# Patient Record
Sex: MALE | Race: BLACK OR AFRICAN AMERICAN | Employment: UNEMPLOYED | URBAN - METROPOLITAN AREA
[De-identification: names, ages, dates, MRNs, and addresses within clinical notes are randomized per-mention and may not be internally consistent; named-entity substitution may affect disease eponyms.]

---

## 2017-12-21 ENCOUNTER — TELEPHONE (OUTPATIENT)
Dept: ORTHOPEDIC SURGERY | Age: 52
End: 2017-12-21

## 2017-12-21 PROBLEM — S82.831A DISPLACED FRACTURE OF DISTAL END OF RIGHT FIBULA: Status: ACTIVE | Noted: 2017-12-21

## 2017-12-21 PROBLEM — R29.6 FREQUENT FALLS: Status: ACTIVE | Noted: 2017-12-21

## 2018-01-05 ENCOUNTER — OFFICE VISIT (OUTPATIENT)
Dept: ORTHOPEDIC SURGERY | Age: 53
End: 2018-01-05

## 2018-01-05 VITALS
BODY MASS INDEX: 24.34 KG/M2 | SYSTOLIC BLOOD PRESSURE: 134 MMHG | WEIGHT: 170 LBS | HEART RATE: 86 BPM | DIASTOLIC BLOOD PRESSURE: 79 MMHG | HEIGHT: 70 IN

## 2018-01-05 DIAGNOSIS — S82.831A DISPLACED FRACTURE OF DISTAL END OF RIGHT FIBULA: Primary | ICD-10-CM

## 2018-01-05 PROCEDURE — L4361 PNEUMA/VAC WALK BOOT PRE OTS: HCPCS | Performed by: ORTHOPAEDIC SURGERY

## 2018-01-05 PROCEDURE — 73610 X-RAY EXAM OF ANKLE: CPT | Performed by: ORTHOPAEDIC SURGERY

## 2018-01-05 PROCEDURE — 99024 POSTOP FOLLOW-UP VISIT: CPT | Performed by: ORTHOPAEDIC SURGERY

## 2018-01-05 NOTE — PROGRESS NOTES
instructions for the use of and application of this item were provided. They were instructed to contact the office immediately should the brace result in increased pain, decreased sensation, increased swelling or worsening of the condition. We'll go ahead and have him fitted for a tall boot. He understands he is still nonweightbearing on this ankle. He can remove the boot for showers and use a shower chair. Otherwise, he can remove it for short periods as long as he is not at risk of walking on his ankle. He needs to continue nonweightbearing for another 3 weeks. I will see him back in 3 weeks to check progress with repeat x-rays 3 views right ankle. He understands and accepts this course of care.

## 2018-01-26 ENCOUNTER — OFFICE VISIT (OUTPATIENT)
Dept: ORTHOPEDIC SURGERY | Age: 53
End: 2018-01-26

## 2018-01-26 VITALS — SYSTOLIC BLOOD PRESSURE: 124 MMHG | DIASTOLIC BLOOD PRESSURE: 76 MMHG | HEART RATE: 80 BPM

## 2018-01-26 DIAGNOSIS — S82.61XD CLOSED FRACTURE OF DISTAL LATERAL MALLEOLUS OF RIGHT FIBULA WITH ROUTINE HEALING, SUBSEQUENT ENCOUNTER: Primary | ICD-10-CM

## 2018-01-26 PROCEDURE — 99024 POSTOP FOLLOW-UP VISIT: CPT | Performed by: ORTHOPAEDIC SURGERY

## 2018-01-29 ENCOUNTER — TELEPHONE (OUTPATIENT)
Dept: ORTHOPEDIC SURGERY | Age: 53
End: 2018-01-29

## 2018-03-02 ENCOUNTER — OFFICE VISIT (OUTPATIENT)
Dept: ORTHOPEDIC SURGERY | Age: 53
End: 2018-03-02

## 2018-03-02 VITALS
WEIGHT: 170 LBS | DIASTOLIC BLOOD PRESSURE: 62 MMHG | HEART RATE: 73 BPM | SYSTOLIC BLOOD PRESSURE: 131 MMHG | BODY MASS INDEX: 24.34 KG/M2 | HEIGHT: 70 IN

## 2018-03-02 DIAGNOSIS — S82.61XD CLOSED FRACTURE OF DISTAL LATERAL MALLEOLUS OF RIGHT FIBULA WITH ROUTINE HEALING, SUBSEQUENT ENCOUNTER: Primary | ICD-10-CM

## 2018-03-02 PROCEDURE — L1902 AFO ANKLE GAUNTLET PRE OTS: HCPCS | Performed by: PHYSICIAN ASSISTANT

## 2018-03-02 PROCEDURE — 99024 POSTOP FOLLOW-UP VISIT: CPT | Performed by: PHYSICIAN ASSISTANT

## 2018-03-02 NOTE — PROGRESS NOTES
Patient Name: Alison Link  Medical Record Number: [id number]  YOB: 1965  Date of Encounter: 3/2/2018     Chief Complaint   Patient presents with    Post-Op Check     fu check for RT ankle ORIF, dos 12/21/17. doing better than before but still some pain        History of Present Illness:   Mr. Alison Link is here in follow up regarding his right distal fibular fracture with ORIF on 12/21/2017. Patient has been at the nursing home. He does have cerebellar atrophy and is chronically unstable on his feet. He states he continues to have pain on the lateral aspect of his right ankle and does not believe it is healed despite what the x-rays show. The patient's past medical history, medications, allergies, family history, social history, and review of systems have been reviewed, and dated and are recorded in the chart under the 'MEDIA\" tab. Physical Exam:    Mr. Alison Link appears well, he is in no apparent distress, he demonstrates appropriate mood & affect. He is alert and oriented to person, place and time. /62   Pulse 73   Ht 5' 10\" (1.778 m)   Wt 170 lb (77.1 kg)   BMI 24.39 kg/m²     On examination of patient's right ankle there is no obvious swelling or joint effusion. Surgical wound is well-healed without signs of infection. He denies tenderness on palpation of the lateral malleolus. His right ankle still very stiff however I believe some of this could be due to his baseline cerebellar atrophy and the fact that he has been in a cam walker boot for several months now. He is moving all of his toes. He has 2 plaster Celsius and posterior tibial pulses with capillary refill <2 seconds. There is no edema or erythema in the affected extremity. There are no signs/symptoms of DVT/PE or infection    Radiology:  X-rays obtained and reviewed in office:   Views:  3 view right ankle including AP, lateral and oblique  Impression:  Patient appears to have good bone healing.   Hardware

## 2018-06-14 ENCOUNTER — TELEPHONE (OUTPATIENT)
Dept: ORTHOPEDIC SURGERY | Age: 53
End: 2018-06-14

## 2018-10-17 ENCOUNTER — OFFICE VISIT (OUTPATIENT)
Dept: ORTHOPEDIC SURGERY | Age: 53
End: 2018-10-17
Payer: COMMERCIAL

## 2018-10-17 VITALS
WEIGHT: 180 LBS | SYSTOLIC BLOOD PRESSURE: 138 MMHG | DIASTOLIC BLOOD PRESSURE: 87 MMHG | HEART RATE: 96 BPM | HEIGHT: 70 IN | BODY MASS INDEX: 25.77 KG/M2

## 2018-10-17 DIAGNOSIS — M79.651 PAIN OF RIGHT THIGH: Primary | ICD-10-CM

## 2018-10-17 PROCEDURE — 99213 OFFICE O/P EST LOW 20 MIN: CPT | Performed by: PHYSICIAN ASSISTANT

## 2018-10-17 NOTE — PROGRESS NOTES
100 mg by mouth 2 times daily 30 capsule 0    vitamin B-12 (CYANOCOBALAMIN) 1000 MCG tablet Take 1,000 mcg by mouth daily      naproxen (NAPROSYN) 500 MG tablet Take 1 tablet by mouth 2 times daily for 20 doses 20 tablet 0     No current facility-administered medications for this visit. Allergies, social and family histories were reviewed and updated as appropriate. Review of Systems:  Relevant review of systems reviewed and available in the patient's chart under the 'MEDIA' tab. Vital Signs:  /87   Pulse 96   Ht 5' 10\" (1.778 m)   Wt 180 lb (81.6 kg)   BMI 25.83 kg/m²     General Exam:   Constitutional: Patient is adequately groomed with no evidence of malnutrition  Mental Status: The patient is oriented to time, place and person. The patient's mood and affect are appropriate. Lymphatic: The lymphatic examination bilaterally reveals all areas to be without enlargement or induration. Neurological: The patient has good coordination and balance. There is no focal weakness or sensory deficit. Right Thigh Examination:    Inspection:  Mild muscle atrophy. No gross atrophy in any particular myotome. There is no obvious swelling or joint effusion of the hip. There are no abrasions, lacerations, contusions, hematomas or ecchymosis. There is no erythema, induration or warmth to suggest an infectious process. Palpation: Patient has mild tenderness on palpation of the entire right anterior thigh     Range of Motion:   Hip flexion: 115°   Hip abduction: 45°   Hip adduction: 30°   Hip internal rotation at 90° of flexion: 35°   hip external rotation at 90° of flexion: 50°       Knee range of motion shows functional range without pain. Ankle dorsiflexion and plantarflexion show functional range of motion.     Strength:    Hip flexion  4-/5   Hip abduction 4- / 5   Hip adduction 4-/5   Knee flexion and extension 4/5 without pain   DF/ PF ankle 4/5    Special Tests:    Log roll (intraarticular care.          Electronically signed by Marbella Boyce PA-C on 38/46/2670  Board Certified [facility]    Please note that portions of this note were completed with a voice recognition program.  Efforts were made to edit the dictations but occasionally words are mis-transcribed.

## 2018-10-25 ENCOUNTER — TELEPHONE (OUTPATIENT)
Dept: ORTHOPEDIC SURGERY | Age: 53
End: 2018-10-25

## 2018-10-25 RX ORDER — METHOCARBAMOL 500 MG/1
500 TABLET, FILM COATED ORAL 3 TIMES DAILY PRN
Qty: 30 TABLET | Refills: 1 | Status: SHIPPED | OUTPATIENT
Start: 2018-10-25 | End: 2018-10-25 | Stop reason: CLARIF

## 2018-10-25 NOTE — TELEPHONE ENCOUNTER
Spoke to [facility] and she stated that patient is in pain and is having a hard time doing PT. She said patient stated that a muscle relaxer ws discussed at his appointment, but I don't see that in the note. Please advise.

## 2019-01-15 ENCOUNTER — APPOINTMENT (OUTPATIENT)
Dept: GENERAL RADIOLOGY | Age: 54
End: 2019-01-15
Payer: COMMERCIAL

## 2019-01-15 ENCOUNTER — HOSPITAL ENCOUNTER (EMERGENCY)
Age: 54
Discharge: HOME | End: 2019-01-16
Attending: EMERGENCY MEDICINE
Payer: COMMERCIAL

## 2019-01-15 VITALS
OXYGEN SATURATION: 95 % | WEIGHT: 180 LBS | HEIGHT: 70 IN | DIASTOLIC BLOOD PRESSURE: 94 MMHG | SYSTOLIC BLOOD PRESSURE: 155 MMHG | TEMPERATURE: 98.8 F | BODY MASS INDEX: 25.77 KG/M2 | RESPIRATION RATE: 18 BRPM | HEART RATE: 99 BPM

## 2019-01-15 DIAGNOSIS — M79.604 RIGHT LEG PAIN: Primary | ICD-10-CM

## 2019-01-15 PROCEDURE — 6370000000 HC RX 637 (ALT 250 FOR IP): Performed by: PHYSICIAN ASSISTANT

## 2019-01-15 PROCEDURE — 93971 EXTREMITY STUDY: CPT

## 2019-01-15 PROCEDURE — 73590 X-RAY EXAM OF LOWER LEG: CPT

## 2019-01-15 PROCEDURE — 99283 EMERGENCY DEPT VISIT LOW MDM: CPT

## 2019-01-15 PROCEDURE — 73552 X-RAY EXAM OF FEMUR 2/>: CPT

## 2019-01-15 RX ORDER — TRAMADOL HYDROCHLORIDE 50 MG/1
50 TABLET ORAL EVERY 6 HOURS PRN
Qty: 20 TABLET | Refills: 0 | Status: SHIPPED | OUTPATIENT
Start: 2019-01-15 | End: 2019-01-20

## 2019-01-15 RX ORDER — CYCLOBENZAPRINE HCL 10 MG
10 TABLET ORAL 3 TIMES DAILY PRN
Qty: 30 TABLET | Refills: 0 | Status: SHIPPED | OUTPATIENT
Start: 2019-01-15 | End: 2019-01-24 | Stop reason: ALTCHOICE

## 2019-01-15 RX ORDER — HYDROCODONE BITARTRATE AND ACETAMINOPHEN 5; 325 MG/1; MG/1
1 TABLET ORAL ONCE
Status: COMPLETED | OUTPATIENT
Start: 2019-01-15 | End: 2019-01-15

## 2019-01-15 RX ORDER — CYCLOBENZAPRINE HCL 10 MG
10 TABLET ORAL ONCE
Status: COMPLETED | OUTPATIENT
Start: 2019-01-15 | End: 2019-01-15

## 2019-01-15 RX ADMIN — HYDROCODONE BITARTRATE AND ACETAMINOPHEN 1 TABLET: 5; 325 TABLET ORAL at 14:39

## 2019-01-15 RX ADMIN — CYCLOBENZAPRINE 10 MG: 10 TABLET, FILM COATED ORAL at 14:39

## 2019-01-15 ASSESSMENT — ENCOUNTER SYMPTOMS
COLOR CHANGE: 0
BACK PAIN: 0
DIARRHEA: 0
RESPIRATORY NEGATIVE: 1
CONSTIPATION: 0
SHORTNESS OF BREATH: 0
COUGH: 0
ABDOMINAL PAIN: 0
VOMITING: 0
NAUSEA: 0

## 2019-01-15 ASSESSMENT — PAIN SCALES - GENERAL
PAINLEVEL_OUTOF10: 5
PAINLEVEL_OUTOF10: 10

## 2019-01-15 ASSESSMENT — PAIN DESCRIPTION - ORIENTATION: ORIENTATION: RIGHT

## 2019-01-15 ASSESSMENT — PAIN DESCRIPTION - PAIN TYPE: TYPE: CHRONIC PAIN

## 2019-01-15 ASSESSMENT — PAIN DESCRIPTION - LOCATION: LOCATION: LEG

## 2019-01-24 ENCOUNTER — HOSPITAL ENCOUNTER (EMERGENCY)
Age: 54
Discharge: HOME | End: 2019-01-24
Payer: COMMERCIAL

## 2019-01-24 VITALS
RESPIRATION RATE: 18 BRPM | WEIGHT: 160 LBS | OXYGEN SATURATION: 95 % | HEART RATE: 104 BPM | TEMPERATURE: 98.8 F | DIASTOLIC BLOOD PRESSURE: 88 MMHG | HEIGHT: 70 IN | BODY MASS INDEX: 22.9 KG/M2 | SYSTOLIC BLOOD PRESSURE: 139 MMHG

## 2019-01-24 DIAGNOSIS — M79.604 RIGHT LEG PAIN: Primary | ICD-10-CM

## 2019-01-24 PROCEDURE — 99282 EMERGENCY DEPT VISIT SF MDM: CPT

## 2019-01-24 PROCEDURE — 6370000000 HC RX 637 (ALT 250 FOR IP): Performed by: PHYSICIAN ASSISTANT

## 2019-01-24 RX ORDER — CYCLOBENZAPRINE HCL 10 MG
10 TABLET ORAL 3 TIMES DAILY PRN
Qty: 30 TABLET | Refills: 0 | Status: SHIPPED | OUTPATIENT
Start: 2019-01-24 | End: 2019-02-03

## 2019-01-24 RX ORDER — HYDROCODONE BITARTRATE AND ACETAMINOPHEN 5; 325 MG/1; MG/1
1 TABLET ORAL EVERY 6 HOURS PRN
Qty: 12 TABLET | Refills: 0 | Status: SHIPPED | OUTPATIENT
Start: 2019-01-24 | End: 2019-01-27

## 2019-01-24 RX ORDER — HYDROCODONE BITARTRATE AND ACETAMINOPHEN 5; 325 MG/1; MG/1
1 TABLET ORAL ONCE
Status: COMPLETED | OUTPATIENT
Start: 2019-01-24 | End: 2019-01-24

## 2019-01-24 RX ADMIN — HYDROCODONE BITARTRATE AND ACETAMINOPHEN 1 TABLET: 5; 325 TABLET ORAL at 02:22

## 2019-01-24 ASSESSMENT — PAIN SCALES - GENERAL: PAINLEVEL_OUTOF10: 10

## 2019-01-24 ASSESSMENT — ENCOUNTER SYMPTOMS
SHORTNESS OF BREATH: 0
COLOR CHANGE: 0
NAUSEA: 0
CONSTIPATION: 0
DIARRHEA: 0
VOMITING: 0
ABDOMINAL PAIN: 0

## 2019-03-22 ENCOUNTER — HOSPITAL ENCOUNTER (OUTPATIENT)
Dept: MRI IMAGING | Age: 54
Discharge: HOME | End: 2019-03-22
Payer: COMMERCIAL

## 2019-03-22 DIAGNOSIS — M25.561 ACUTE PAIN OF RIGHT KNEE: ICD-10-CM

## 2019-03-22 DIAGNOSIS — M25.551 RIGHT HIP PAIN: ICD-10-CM

## 2019-03-22 PROCEDURE — 73721 MRI JNT OF LWR EXTRE W/O DYE: CPT

## 2019-07-18 ENCOUNTER — APPOINTMENT (OUTPATIENT)
Dept: GENERAL RADIOLOGY | Age: 54
End: 2019-07-18
Payer: COMMERCIAL

## 2019-07-18 ENCOUNTER — HOSPITAL ENCOUNTER (EMERGENCY)
Age: 54
Discharge: HOME | End: 2019-07-18
Attending: FAMILY MEDICINE
Payer: COMMERCIAL

## 2019-07-18 VITALS
TEMPERATURE: 98.5 F | BODY MASS INDEX: 22.9 KG/M2 | WEIGHT: 160 LBS | DIASTOLIC BLOOD PRESSURE: 91 MMHG | HEART RATE: 75 BPM | RESPIRATION RATE: 17 BRPM | OXYGEN SATURATION: 96 % | HEIGHT: 70 IN | SYSTOLIC BLOOD PRESSURE: 131 MMHG

## 2019-07-18 DIAGNOSIS — R11.10 POST-TUSSIVE EMESIS: Primary | ICD-10-CM

## 2019-07-18 PROCEDURE — 94640 AIRWAY INHALATION TREATMENT: CPT

## 2019-07-18 PROCEDURE — 6370000000 HC RX 637 (ALT 250 FOR IP): Performed by: FAMILY MEDICINE

## 2019-07-18 PROCEDURE — 71045 X-RAY EXAM CHEST 1 VIEW: CPT

## 2019-07-18 PROCEDURE — 99283 EMERGENCY DEPT VISIT LOW MDM: CPT

## 2019-07-18 RX ORDER — AMLODIPINE BESYLATE 10 MG/1
10 TABLET ORAL DAILY
COMMUNITY

## 2019-07-18 RX ORDER — DIVALPROEX SODIUM 500 MG/1
500 TABLET, EXTENDED RELEASE ORAL DAILY
COMMUNITY

## 2019-07-18 RX ORDER — ONDANSETRON 4 MG/1
8 TABLET, ORALLY DISINTEGRATING ORAL ONCE
Status: COMPLETED | OUTPATIENT
Start: 2019-07-18 | End: 2019-07-18

## 2019-07-18 RX ORDER — DULOXETIN HYDROCHLORIDE 30 MG/1
30 CAPSULE, DELAYED RELEASE ORAL DAILY
COMMUNITY

## 2019-07-18 RX ORDER — PROMETHAZINE HYDROCHLORIDE AND CODEINE PHOSPHATE 6.25; 1 MG/5ML; MG/5ML
5 SYRUP ORAL 4 TIMES DAILY PRN
Qty: 118 ML | Refills: 0 | Status: SHIPPED | OUTPATIENT
Start: 2019-07-18 | End: 2019-07-24

## 2019-07-18 RX ORDER — TIZANIDINE 4 MG/1
4 TABLET ORAL EVERY 6 HOURS PRN
COMMUNITY

## 2019-07-18 RX ORDER — IPRATROPIUM BROMIDE AND ALBUTEROL SULFATE 2.5; .5 MG/3ML; MG/3ML
1 SOLUTION RESPIRATORY (INHALATION) ONCE
Status: COMPLETED | OUTPATIENT
Start: 2019-07-18 | End: 2019-07-18

## 2019-07-18 RX ORDER — TRAZODONE HYDROCHLORIDE 100 MG/1
75 TABLET ORAL NIGHTLY
COMMUNITY

## 2019-07-18 RX ORDER — GUAIFENESIN 100 MG/5ML
200 SOLUTION ORAL ONCE
Status: COMPLETED | OUTPATIENT
Start: 2019-07-18 | End: 2019-07-18

## 2019-07-18 RX ADMIN — IPRATROPIUM BROMIDE AND ALBUTEROL SULFATE 1 AMPULE: .5; 3 SOLUTION RESPIRATORY (INHALATION) at 03:50

## 2019-07-18 RX ADMIN — GUAIFENESIN 200 MG: 100 SOLUTION ORAL at 03:43

## 2019-07-18 RX ADMIN — ONDANSETRON 8 MG: 4 TABLET, ORALLY DISINTEGRATING ORAL at 03:42

## 2019-07-18 ASSESSMENT — PAIN SCALES - GENERAL: PAINLEVEL_OUTOF10: 7

## 2019-07-18 NOTE — ED NOTES
Pt reports he has had a cough that \"makes him vomit everywhere. \" Per pt he was unable to get adequate relief from medications at [facility] Pt is afebrile. Heart sounds regular no rubs or murmurs. Lungs diminished t/o dry cough noted. Vitals as charted. Pt medicated per MD orders. Call light in reach bed in low position side rails in place x 2 for safety. Will monitor.       Kain Lisa RN  07/18/19 [id number]

## 2019-07-18 NOTE — ED NOTES
Pt reports he is feeling better. Attempted to call report called to [facility] x 5 transferred to multiple phones and no one reportedly available to receive report. Will send discharge instructions and follow up with patient along with copy of chart and AVS at time of transport.        Cinthya Wyatt RN  07/18/19 2928

## 2019-07-21 NOTE — ED PROVIDER NOTES
Triage Chief Complaint:   Illness (pt from [facility]. pt states he has been feeling poorly c/o nausea and needs nyquil however per NH staff would not take the medications available to him. )    [place]:  Marialuisa Cage is a 48 y.o. male that presents from [facility]. Per the ECF patient has been complaining of nausea although has declined any of his PRN medications. Patient demanded to be sent to the emergency department for evaluation. By their report he has been afebrile and has had not actual any witnessed episodes of vomiting. Patient does note coughing and after coughing a sense of nausea. He denies vomiting. No diarrhea constipation. No fevers chills. As noticed above no treatment tried prior to arrival despite being offered. ROS:  General:  No fevers, no chills, no weakness  Eyes:  No recent vison changes, no discharge  ENT:  No sore throat, no nasal congestion, no hearing changes  Cardiovascular:  No chest pain, no palpitations  Respiratory: As above  Gastrointestinal: As above  Musculoskeletal:  No muscle pain, no joint pain  Skin:  No rash, no pruritis, no easy bruising  Neurologic: No new neurologic symptoms.   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 Cranial nerves II through XII are grossly intact. Psychiatric:  Appropriate    I have reviewed and interpreted all of the currently available lab results from this visit (if applicable):  No results found for this visit on 07/18/19. Radiographs (if obtained):  [] The following radiograph was interpreted by myself in the absence of a radiologist:   [] Radiologist's Report Reviewed:  XR CHEST PORTABLE   Final Result   Negative portable chest.               EKG (if obtained): (All EKG's are interpreted by myself in the absence of a cardiologist)    Chart review shows recent radiographs:  Xr Chest Portable    Result Date: 7/18/2019  EXAMINATION: ONE XRAY VIEW OF THE CHEST 7/18/2019 3:57 am COMPARISON: 12/20/2017 HISTORY: ORDERING SYSTEM PROVIDED HISTORY: cough TECHNOLOGIST PROVIDED HISTORY: Reason for exam:->cough Reason for Exam: cough,nausea and vomiting Acuity: Acute Type of Exam: Initial FINDINGS: There is minimal left basilar atelectasis. The lungs are otherwise clear. The costophrenic angles are sharp. The cardiomediastinal silhouette is within normal limits. There is no discernible pneumothorax. Negative portable chest.       MDM:  28-year-old nontoxic well-appearing male with normal vital signs. Chest x-ray with no exudate, effusion, infiltrate, widened mediastinum, pulmonary congestion, cardiomegaly    Symptomatically treated with duo nebs, Zofran, Robitussin. Patient observed in the emergency department for 4 hours during that time he had no coughing, he had no vomiting, he was drinking water without difficulty. I estimate there is LOW risk for (including but not limited to) ACUTE CORONARY SYNDROME, PNEUMONIA REQUIRING ADMISSION, SEPSIS OR BACTERIAL MENINGITIS thus I consider the discharge disposition reasonable. Ines Aparicioudent (or their surrogate) and I have discussed the diagnosis and risks, and we agree with discharging home with close follow-up.  We also discussed

## 2019-08-15 ENCOUNTER — HOSPITAL ENCOUNTER (EMERGENCY)
Age: 54
Discharge: HOME | End: 2019-08-15
Attending: EMERGENCY MEDICINE
Payer: COMMERCIAL

## 2019-08-15 VITALS
TEMPERATURE: 97 F | OXYGEN SATURATION: 96 % | BODY MASS INDEX: 22.96 KG/M2 | HEART RATE: 94 BPM | SYSTOLIC BLOOD PRESSURE: 129 MMHG | RESPIRATION RATE: 16 BRPM | WEIGHT: 160 LBS | DIASTOLIC BLOOD PRESSURE: 81 MMHG

## 2019-08-15 DIAGNOSIS — R11.2 NON-INTRACTABLE VOMITING WITH NAUSEA, UNSPECIFIED VOMITING TYPE: Primary | ICD-10-CM

## 2019-08-15 LAB
A/G RATIO: 1.2 (ref 1.1–2.2)
ALBUMIN SERPL-MCNC: 3.8 G/DL (ref 3.4–5)
ALP BLD-CCNC: 75 U/L (ref 40–129)
ALT SERPL-CCNC: 17 U/L (ref 10–40)
ANION GAP SERPL CALCULATED.3IONS-SCNC: 9 MMOL/L (ref 3–16)
AST SERPL-CCNC: 13 U/L (ref 15–37)
BASOPHILS ABSOLUTE: 0 K/UL (ref 0–0.2)
BASOPHILS RELATIVE PERCENT: 0.6 %
BILIRUB SERPL-MCNC: 0.6 MG/DL (ref 0–1)
BUN BLDV-MCNC: 12 MG/DL (ref 7–20)
CALCIUM SERPL-MCNC: 8.9 MG/DL (ref 8.3–10.6)
CHLORIDE BLD-SCNC: 104 MMOL/L (ref 99–110)
CO2: 32 MMOL/L (ref 21–32)
CREAT SERPL-MCNC: 0.8 MG/DL (ref 0.9–1.3)
EKG ATRIAL RATE: 84 BPM
EKG DIAGNOSIS: NORMAL
EKG P AXIS: 44 DEGREES
EKG P-R INTERVAL: 132 MS
EKG Q-T INTERVAL: 310 MS
EKG QRS DURATION: 72 MS
EKG QTC CALCULATION (BAZETT): 366 MS
EKG R AXIS: 50 DEGREES
EKG T AXIS: 73 DEGREES
EKG VENTRICULAR RATE: 84 BPM
EOSINOPHILS ABSOLUTE: 0.2 K/UL (ref 0–0.6)
EOSINOPHILS RELATIVE PERCENT: 2.1 %
GFR AFRICAN AMERICAN: >60
GFR NON-AFRICAN AMERICAN: >60
GLOBULIN: 3.3 G/DL
GLUCOSE BLD-MCNC: 104 MG/DL (ref 70–99)
HCT VFR BLD CALC: 45 % (ref 40.5–52.5)
HEMOGLOBIN: 14.7 G/DL (ref 13.5–17.5)
LYMPHOCYTES ABSOLUTE: 2.6 K/UL (ref 1–5.1)
LYMPHOCYTES RELATIVE PERCENT: 32.3 %
MCH RBC QN AUTO: 28.9 PG (ref 26–34)
MCHC RBC AUTO-ENTMCNC: 32.6 G/DL (ref 31–36)
MCV RBC AUTO: 88.8 FL (ref 80–100)
MONOCYTES ABSOLUTE: 0.8 K/UL (ref 0–1.3)
MONOCYTES RELATIVE PERCENT: 9.6 %
NEUTROPHILS ABSOLUTE: 4.6 K/UL (ref 1.7–7.7)
NEUTROPHILS RELATIVE PERCENT: 55.4 %
PDW BLD-RTO: 14.4 % (ref 12.4–15.4)
PLATELET # BLD: 164 K/UL (ref 135–450)
PMV BLD AUTO: 10.1 FL (ref 5–10.5)
POTASSIUM REFLEX MAGNESIUM: 4 MMOL/L (ref 3.5–5.1)
RBC # BLD: 5.07 M/UL (ref 4.2–5.9)
SODIUM BLD-SCNC: 145 MMOL/L (ref 136–145)
TOTAL PROTEIN: 7.1 G/DL (ref 6.4–8.2)
TROPONIN: <0.01 NG/ML
WBC # BLD: 8.2 K/UL (ref 4–11)

## 2019-08-15 PROCEDURE — 84484 ASSAY OF TROPONIN QUANT: CPT

## 2019-08-15 PROCEDURE — 99284 EMERGENCY DEPT VISIT MOD MDM: CPT

## 2019-08-15 PROCEDURE — 85025 COMPLETE CBC W/AUTO DIFF WBC: CPT

## 2019-08-15 PROCEDURE — 93005 ELECTROCARDIOGRAM TRACING: CPT | Performed by: EMERGENCY MEDICINE

## 2019-08-15 PROCEDURE — 93010 ELECTROCARDIOGRAM REPORT: CPT | Performed by: INTERNAL MEDICINE

## 2019-08-15 PROCEDURE — 80053 COMPREHEN METABOLIC PANEL: CPT

## 2019-08-15 RX ORDER — ONDANSETRON 4 MG/1
4 TABLET, ORALLY DISINTEGRATING ORAL EVERY 8 HOURS PRN
Qty: 20 TABLET | Refills: 0 | Status: SHIPPED | OUTPATIENT
Start: 2019-08-15 | End: 2019-08-22

## 2019-08-15 RX ORDER — ONDANSETRON 2 MG/ML
4 INJECTION INTRAMUSCULAR; INTRAVENOUS ONCE
Status: DISCONTINUED | OUTPATIENT
Start: 2019-08-15 | End: 2019-08-15 | Stop reason: HOSPADM

## 2019-08-15 NOTE — ED PROVIDER NOTES
[address] [facility]  eMERGENCY dEPARTMENTeNCOUnter      Pt Name: Ines Owens  MRN: [id number]  Armstracygfdavid 1965  Date of evaluation: 8/15/2019  Provider: Malcolm Mayen MD    CHIEF COMPLAINT       Chief Complaint   Patient presents with    Emesis     pt arrived via  EMS from [facility], emesis x2 days being treated by staff. due to se MD tomorrow, called EMS himself         HISTORY OF PRESENT ILLNESS   (Location/Symptom, Timing/Onset,Context/Setting, Quality, Duration, Modifying Factors, Severity)  Note limiting factors. Ines Owens is a 48 y.o. male who presents to the emergency department for emesis x2 days. The patient states that he does not have any abdominal pain no diarrhea. No urinary complaints. No cough or fevers. The patient has sandwiches in front of him and he is very distracted by his sandwiches. He is asking for a straw to drink soda and additional sandwiches because he states that he is hungry. Not given me any additional information at this time. Nursing notes were reviewed. REVIEW OF SYSTEMS    (2-9 systems for level 4, 10 or more for level 5)     Review of Systems    Positive and pertinent negative as per HPI. Except as noted above in the ROS, all other systems were reviewed and were negative.     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

## 2022-01-26 ENCOUNTER — APPOINTMENT (OUTPATIENT)
Dept: CT IMAGING | Age: 57
DRG: 208 | End: 2022-01-26
Payer: COMMERCIAL

## 2022-01-26 ENCOUNTER — HOSPITAL ENCOUNTER (INPATIENT)
Age: 57
LOS: 4 days | Discharge: NURSING, REHAB OR LONG-TERM CARE | DRG: 208 | End: 2022-01-30
Attending: EMERGENCY MEDICINE | Admitting: INTERNAL MEDICINE
Payer: COMMERCIAL

## 2022-01-26 ENCOUNTER — APPOINTMENT (OUTPATIENT)
Dept: GENERAL RADIOLOGY | Age: 57
DRG: 208 | End: 2022-01-26
Payer: COMMERCIAL

## 2022-01-26 DIAGNOSIS — R06.89 HYPERCAPNIA: ICD-10-CM

## 2022-01-26 DIAGNOSIS — R41.82 ALTERED MENTAL STATUS, UNSPECIFIED ALTERED MENTAL STATUS TYPE: Primary | ICD-10-CM

## 2022-01-26 DIAGNOSIS — I26.94 MULTIPLE SUBSEGMENTAL PULMONARY EMBOLI WITHOUT ACUTE COR PULMONALE (HCC): ICD-10-CM

## 2022-01-26 DIAGNOSIS — R06.89 CO2 NARCOSIS: ICD-10-CM

## 2022-01-26 DIAGNOSIS — I26.99 ACUTE PULMONARY EMBOLISM WITHOUT ACUTE COR PULMONALE, UNSPECIFIED PULMONARY EMBOLISM TYPE (HCC): ICD-10-CM

## 2022-01-26 LAB
A/G RATIO: 1 (ref 1.1–2.2)
ACETAMINOPHEN LEVEL: <5 UG/ML (ref 10–30)
ALBUMIN SERPL-MCNC: 3.8 G/DL (ref 3.4–5)
ALP BLD-CCNC: 67 U/L (ref 40–129)
ALT SERPL-CCNC: 40 U/L (ref 10–40)
AMMONIA: 79 UMOL/L (ref 16–60)
AMPHETAMINE SCREEN, URINE: NORMAL
ANION GAP SERPL CALCULATED.3IONS-SCNC: 7 MMOL/L (ref 3–16)
APTT: 26.4 SEC (ref 26.2–38.6)
AST SERPL-CCNC: 32 U/L (ref 15–37)
BARBITURATE SCREEN URINE: NORMAL
BASE EXCESS VENOUS: 10.3 MMOL/L (ref -3–3)
BASOPHILS ABSOLUTE: 0 K/UL (ref 0–0.2)
BASOPHILS RELATIVE PERCENT: 0.4 %
BENZODIAZEPINE SCREEN, URINE: NORMAL
BILIRUB SERPL-MCNC: 0.5 MG/DL (ref 0–1)
BILIRUBIN URINE: NEGATIVE
BLOOD, URINE: NEGATIVE
BUN BLDV-MCNC: 24 MG/DL (ref 7–20)
CALCIUM SERPL-MCNC: 9.1 MG/DL (ref 8.3–10.6)
CANNABINOID SCREEN URINE: NORMAL
CARBOXYHEMOGLOBIN: 4.8 % (ref 0–1.5)
CHLORIDE BLD-SCNC: 99 MMOL/L (ref 99–110)
CLARITY: CLEAR
CO2: 38 MMOL/L (ref 21–32)
COCAINE METABOLITE SCREEN URINE: NORMAL
COLOR: YELLOW
CREAT SERPL-MCNC: 0.6 MG/DL (ref 0.9–1.3)
EOSINOPHILS ABSOLUTE: 0 K/UL (ref 0–0.6)
EOSINOPHILS RELATIVE PERCENT: 0 %
EPITHELIAL CELLS, UA: 6 /HPF (ref 0–5)
ETHANOL: NORMAL MG/DL (ref 0–0.08)
GFR AFRICAN AMERICAN: >60
GFR NON-AFRICAN AMERICAN: >60
GLUCOSE BLD-MCNC: 161 MG/DL (ref 70–99)
GLUCOSE BLD-MCNC: 166 MG/DL (ref 70–99)
GLUCOSE URINE: NEGATIVE MG/DL
HCO3 VENOUS: 44.4 MMOL/L (ref 23–29)
HCT VFR BLD CALC: 42.6 % (ref 40.5–52.5)
HCT VFR BLD CALC: 51.3 % (ref 40.5–52.5)
HEMOGLOBIN, VEN, REDUCED: 3 %
HEMOGLOBIN: 13.6 G/DL (ref 13.5–17.5)
HEMOGLOBIN: 16.2 G/DL (ref 13.5–17.5)
HYALINE CASTS: 17 /LPF (ref 0–8)
KETONES, URINE: NEGATIVE MG/DL
LACTIC ACID, SEPSIS: 0.8 MMOL/L (ref 0.4–1.9)
LACTIC ACID, SEPSIS: 1.6 MMOL/L (ref 0.4–1.9)
LEUKOCYTE ESTERASE, URINE: NEGATIVE
LIPASE: 49 U/L (ref 13–60)
LYMPHOCYTES ABSOLUTE: 0.9 K/UL (ref 1–5.1)
LYMPHOCYTES RELATIVE PERCENT: 10.6 %
Lab: NORMAL
MCH RBC QN AUTO: 29.4 PG (ref 26–34)
MCH RBC QN AUTO: 29.6 PG (ref 26–34)
MCHC RBC AUTO-ENTMCNC: 31.6 G/DL (ref 31–36)
MCHC RBC AUTO-ENTMCNC: 31.9 G/DL (ref 31–36)
MCV RBC AUTO: 92.2 FL (ref 80–100)
MCV RBC AUTO: 93.8 FL (ref 80–100)
METHADONE SCREEN, URINE: NORMAL
METHEMOGLOBIN VENOUS: 0.6 %
MICROSCOPIC EXAMINATION: YES
MONOCYTES ABSOLUTE: 0.7 K/UL (ref 0–1.3)
MONOCYTES RELATIVE PERCENT: 8 %
NEUTROPHILS ABSOLUTE: 7.1 K/UL (ref 1.7–7.7)
NEUTROPHILS RELATIVE PERCENT: 81 %
NITRITE, URINE: NEGATIVE
O2 CONTENT, VEN: 22 VOL %
O2 SAT, VEN: 97 %
O2 THERAPY: ABNORMAL
OPIATE SCREEN URINE: NORMAL
OXYCODONE URINE: NORMAL
PCO2, VEN: >98.3 MMHG (ref 40–50)
PDW BLD-RTO: 14.1 % (ref 12.4–15.4)
PDW BLD-RTO: 14.5 % (ref 12.4–15.4)
PERFORMED ON: ABNORMAL
PH UA: 5
PH UA: 5 (ref 5–8)
PH VENOUS: 7.21 (ref 7.35–7.45)
PHENCYCLIDINE SCREEN URINE: NORMAL
PLATELET # BLD: 211 K/UL (ref 135–450)
PLATELET # BLD: 232 K/UL (ref 135–450)
PMV BLD AUTO: 10.1 FL (ref 5–10.5)
PMV BLD AUTO: 9.5 FL (ref 5–10.5)
PO2, VEN: 102 MMHG (ref 25–40)
POTASSIUM SERPL-SCNC: 5 MMOL/L (ref 3.5–5.1)
PRO-BNP: 45 PG/ML (ref 0–124)
PROPOXYPHENE SCREEN: NORMAL
PROTEIN UA: 30 MG/DL
RBC # BLD: 4.62 M/UL (ref 4.2–5.9)
RBC # BLD: 5.47 M/UL (ref 4.2–5.9)
RBC UA: 3 /HPF (ref 0–4)
SALICYLATE, SERUM: <0.3 MG/DL (ref 15–30)
SODIUM BLD-SCNC: 144 MMOL/L (ref 136–145)
SPECIFIC GRAVITY UA: 1.03 (ref 1–1.03)
TCO2 CALC VENOUS: 107 MMOL/L
TOTAL PROTEIN: 7.6 G/DL (ref 6.4–8.2)
TROPONIN: 0.02 NG/ML
TROPONIN: <0.01 NG/ML
URINE REFLEX TO CULTURE: ABNORMAL
URINE TYPE: ABNORMAL
UROBILINOGEN, URINE: 0.2 E.U./DL
WBC # BLD: 8.1 K/UL (ref 4–11)
WBC # BLD: 8.7 K/UL (ref 4–11)
WBC UA: 6 /HPF (ref 0–5)

## 2022-01-26 PROCEDURE — 6360000002 HC RX W HCPCS: Performed by: EMERGENCY MEDICINE

## 2022-01-26 PROCEDURE — 2580000003 HC RX 258: Performed by: EMERGENCY MEDICINE

## 2022-01-26 PROCEDURE — 85730 THROMBOPLASTIN TIME PARTIAL: CPT

## 2022-01-26 PROCEDURE — 70450 CT HEAD/BRAIN W/O DYE: CPT

## 2022-01-26 PROCEDURE — 6360000002 HC RX W HCPCS: Performed by: INTERNAL MEDICINE

## 2022-01-26 PROCEDURE — 99284 EMERGENCY DEPT VISIT MOD MDM: CPT

## 2022-01-26 PROCEDURE — 6360000002 HC RX W HCPCS: Performed by: PHYSICIAN ASSISTANT

## 2022-01-26 PROCEDURE — 85025 COMPLETE CBC W/AUTO DIFF WBC: CPT

## 2022-01-26 PROCEDURE — 83880 ASSAY OF NATRIURETIC PEPTIDE: CPT

## 2022-01-26 PROCEDURE — 36415 COLL VENOUS BLD VENIPUNCTURE: CPT

## 2022-01-26 PROCEDURE — 80179 DRUG ASSAY SALICYLATE: CPT

## 2022-01-26 PROCEDURE — 80143 DRUG ASSAY ACETAMINOPHEN: CPT

## 2022-01-26 PROCEDURE — 82803 BLOOD GASES ANY COMBINATION: CPT

## 2022-01-26 PROCEDURE — 99291 CRITICAL CARE FIRST HOUR: CPT

## 2022-01-26 PROCEDURE — 31500 INSERT EMERGENCY AIRWAY: CPT

## 2022-01-26 PROCEDURE — 80053 COMPREHEN METABOLIC PANEL: CPT

## 2022-01-26 PROCEDURE — 36556 INSERT NON-TUNNEL CV CATH: CPT

## 2022-01-26 PROCEDURE — 74018 RADEX ABDOMEN 1 VIEW: CPT

## 2022-01-26 PROCEDURE — 5A1945Z RESPIRATORY VENTILATION, 24-96 CONSECUTIVE HOURS: ICD-10-PCS | Performed by: INTERNAL MEDICINE

## 2022-01-26 PROCEDURE — 71260 CT THORAX DX C+: CPT

## 2022-01-26 PROCEDURE — 83605 ASSAY OF LACTIC ACID: CPT

## 2022-01-26 PROCEDURE — 87040 BLOOD CULTURE FOR BACTERIA: CPT

## 2022-01-26 PROCEDURE — 02HV33Z INSERTION OF INFUSION DEVICE INTO SUPERIOR VENA CAVA, PERCUTANEOUS APPROACH: ICD-10-PCS | Performed by: INTERNAL MEDICINE

## 2022-01-26 PROCEDURE — 51702 INSERT TEMP BLADDER CATH: CPT

## 2022-01-26 PROCEDURE — 6360000004 HC RX CONTRAST MEDICATION: Performed by: EMERGENCY MEDICINE

## 2022-01-26 PROCEDURE — 71045 X-RAY EXAM CHEST 1 VIEW: CPT

## 2022-01-26 PROCEDURE — 1200000000 HC SEMI PRIVATE

## 2022-01-26 PROCEDURE — 84484 ASSAY OF TROPONIN QUANT: CPT

## 2022-01-26 PROCEDURE — 93005 ELECTROCARDIOGRAM TRACING: CPT | Performed by: PHYSICIAN ASSISTANT

## 2022-01-26 PROCEDURE — 82077 ASSAY SPEC XCP UR&BREATH IA: CPT

## 2022-01-26 PROCEDURE — 36592 COLLECT BLOOD FROM PICC: CPT

## 2022-01-26 PROCEDURE — 81001 URINALYSIS AUTO W/SCOPE: CPT

## 2022-01-26 PROCEDURE — 2500000003 HC RX 250 WO HCPCS: Performed by: EMERGENCY MEDICINE

## 2022-01-26 PROCEDURE — 2580000003 HC RX 258: Performed by: INTERNAL MEDICINE

## 2022-01-26 PROCEDURE — U0005 INFEC AGEN DETEC AMPLI PROBE: HCPCS

## 2022-01-26 PROCEDURE — U0003 INFECTIOUS AGENT DETECTION BY NUCLEIC ACID (DNA OR RNA); SEVERE ACUTE RESPIRATORY SYNDROME CORONAVIRUS 2 (SARS-COV-2) (CORONAVIRUS DISEASE [COVID-19]), AMPLIFIED PROBE TECHNIQUE, MAKING USE OF HIGH THROUGHPUT TECHNOLOGIES AS DESCRIBED BY CMS-2020-01-R: HCPCS

## 2022-01-26 PROCEDURE — 82140 ASSAY OF AMMONIA: CPT

## 2022-01-26 PROCEDURE — 94002 VENT MGMT INPAT INIT DAY: CPT

## 2022-01-26 PROCEDURE — 96374 THER/PROPH/DIAG INJ IV PUSH: CPT

## 2022-01-26 PROCEDURE — 85027 COMPLETE CBC AUTOMATED: CPT

## 2022-01-26 PROCEDURE — 84478 ASSAY OF TRIGLYCERIDES: CPT

## 2022-01-26 PROCEDURE — 0BH18EZ INSERTION OF ENDOTRACHEAL AIRWAY INTO TRACHEA, VIA NATURAL OR ARTIFICIAL OPENING ENDOSCOPIC: ICD-10-PCS | Performed by: INTERNAL MEDICINE

## 2022-01-26 PROCEDURE — 83690 ASSAY OF LIPASE: CPT

## 2022-01-26 PROCEDURE — 80307 DRUG TEST PRSMV CHEM ANLYZR: CPT

## 2022-01-26 RX ORDER — AMMONIA INHALANTS 0.04 G/.3ML
INHALANT RESPIRATORY (INHALATION)
Status: DISCONTINUED
Start: 2022-01-26 | End: 2022-01-26

## 2022-01-26 RX ORDER — HEPARIN SODIUM 10000 [USP'U]/100ML
5-30 INJECTION, SOLUTION INTRAVENOUS CONTINUOUS
Status: DISCONTINUED | OUTPATIENT
Start: 2022-01-26 | End: 2022-01-27

## 2022-01-26 RX ORDER — ONDANSETRON 2 MG/ML
4 INJECTION INTRAMUSCULAR; INTRAVENOUS EVERY 6 HOURS PRN
Status: DISCONTINUED | OUTPATIENT
Start: 2022-01-26 | End: 2022-01-30 | Stop reason: HOSPADM

## 2022-01-26 RX ORDER — ONDANSETRON 4 MG/1
4 TABLET, ORALLY DISINTEGRATING ORAL EVERY 8 HOURS PRN
Status: DISCONTINUED | OUTPATIENT
Start: 2022-01-26 | End: 2022-01-30 | Stop reason: HOSPADM

## 2022-01-26 RX ORDER — POLYETHYLENE GLYCOL 3350 17 G/17G
17 POWDER, FOR SOLUTION ORAL DAILY PRN
Status: DISCONTINUED | OUTPATIENT
Start: 2022-01-26 | End: 2022-01-30 | Stop reason: HOSPADM

## 2022-01-26 RX ORDER — SODIUM CHLORIDE 0.9 % (FLUSH) 0.9 %
5-40 SYRINGE (ML) INJECTION PRN
Status: DISCONTINUED | OUTPATIENT
Start: 2022-01-26 | End: 2022-01-30 | Stop reason: HOSPADM

## 2022-01-26 RX ORDER — SODIUM CHLORIDE 9 MG/ML
30 INJECTION, SOLUTION INTRAVENOUS ONCE
Status: COMPLETED | OUTPATIENT
Start: 2022-01-26 | End: 2022-01-26

## 2022-01-26 RX ORDER — HEPARIN SODIUM 1000 [USP'U]/ML
80 INJECTION, SOLUTION INTRAVENOUS; SUBCUTANEOUS PRN
Status: DISCONTINUED | OUTPATIENT
Start: 2022-01-26 | End: 2022-01-27

## 2022-01-26 RX ORDER — ETOMIDATE 2 MG/ML
20 INJECTION INTRAVENOUS ONCE
Status: COMPLETED | OUTPATIENT
Start: 2022-01-26 | End: 2022-01-26

## 2022-01-26 RX ORDER — HEPARIN SODIUM 1000 [USP'U]/ML
80 INJECTION, SOLUTION INTRAVENOUS; SUBCUTANEOUS ONCE
Status: COMPLETED | OUTPATIENT
Start: 2022-01-26 | End: 2022-01-26

## 2022-01-26 RX ORDER — ASCORBIC ACID 500 MG
500 TABLET ORAL DAILY
COMMUNITY

## 2022-01-26 RX ORDER — FENTANYL CITRATE-0.9 % NACL/PF 10 MCG/ML
12.5-2 PLASTIC BAG, INJECTION (ML) INTRAVENOUS CONTINUOUS
Status: DISCONTINUED | OUTPATIENT
Start: 2022-01-26 | End: 2022-01-26 | Stop reason: SDUPTHER

## 2022-01-26 RX ORDER — SODIUM CHLORIDE 9 MG/ML
25 INJECTION, SOLUTION INTRAVENOUS PRN
Status: DISCONTINUED | OUTPATIENT
Start: 2022-01-26 | End: 2022-01-30 | Stop reason: HOSPADM

## 2022-01-26 RX ORDER — NOREPINEPHRINE BIT/0.9 % NACL 16MG/250ML
2-100 INFUSION BOTTLE (ML) INTRAVENOUS CONTINUOUS
Status: DISCONTINUED | OUTPATIENT
Start: 2022-01-26 | End: 2022-01-29 | Stop reason: ALTCHOICE

## 2022-01-26 RX ORDER — ACETAMINOPHEN 650 MG/1
650 SUPPOSITORY RECTAL EVERY 6 HOURS PRN
Status: DISCONTINUED | OUTPATIENT
Start: 2022-01-26 | End: 2022-01-30 | Stop reason: HOSPADM

## 2022-01-26 RX ORDER — PROPOFOL 10 MG/ML
5-50 INJECTION, EMULSION INTRAVENOUS
Status: DISCONTINUED | OUTPATIENT
Start: 2022-01-26 | End: 2022-01-29 | Stop reason: ALTCHOICE

## 2022-01-26 RX ORDER — SODIUM CHLORIDE 0.9 % (FLUSH) 0.9 %
5-40 SYRINGE (ML) INJECTION EVERY 12 HOURS SCHEDULED
Status: DISCONTINUED | OUTPATIENT
Start: 2022-01-26 | End: 2022-01-30 | Stop reason: HOSPADM

## 2022-01-26 RX ORDER — ZINC GLUCONATE 50 MG
50 TABLET ORAL DAILY
COMMUNITY

## 2022-01-26 RX ORDER — ROCURONIUM BROMIDE 10 MG/ML
100 INJECTION, SOLUTION INTRAVENOUS ONCE
Status: COMPLETED | OUTPATIENT
Start: 2022-01-26 | End: 2022-01-26

## 2022-01-26 RX ORDER — ACETAMINOPHEN 325 MG/1
650 TABLET ORAL EVERY 6 HOURS PRN
Status: DISCONTINUED | OUTPATIENT
Start: 2022-01-26 | End: 2022-01-30 | Stop reason: HOSPADM

## 2022-01-26 RX ORDER — HEPARIN SODIUM 1000 [USP'U]/ML
40 INJECTION, SOLUTION INTRAVENOUS; SUBCUTANEOUS PRN
Status: DISCONTINUED | OUTPATIENT
Start: 2022-01-26 | End: 2022-01-27

## 2022-01-26 RX ADMIN — ROCURONIUM BROMIDE 100 MG: 10 INJECTION, SOLUTION INTRAVENOUS at 13:36

## 2022-01-26 RX ADMIN — SODIUM CHLORIDE 1000 ML: 9 INJECTION, SOLUTION INTRAVENOUS at 22:42

## 2022-01-26 RX ADMIN — PROPOFOL 15 MCG/KG/MIN: 10 INJECTION, EMULSION INTRAVENOUS at 13:49

## 2022-01-26 RX ADMIN — PROPOFOL 20 MCG/KG/MIN: 10 INJECTION, EMULSION INTRAVENOUS at 16:35

## 2022-01-26 RX ADMIN — CEFEPIME HYDROCHLORIDE 2000 MG: 2 INJECTION, POWDER, FOR SOLUTION INTRAVENOUS at 22:44

## 2022-01-26 RX ADMIN — HEPARIN SODIUM 4800 UNITS: 1000 INJECTION INTRAVENOUS; SUBCUTANEOUS at 18:44

## 2022-01-26 RX ADMIN — ETOMIDATE 20 MG: 20 INJECTION, SOLUTION INTRAVENOUS at 13:36

## 2022-01-26 RX ADMIN — SODIUM CHLORIDE 1800 ML: 9 INJECTION, SOLUTION INTRAVENOUS at 13:51

## 2022-01-26 RX ADMIN — Medication 1 MG/HR: at 22:49

## 2022-01-26 RX ADMIN — PROPOFOL 50 MCG/KG/MIN: 10 INJECTION, EMULSION INTRAVENOUS at 22:59

## 2022-01-26 RX ADMIN — Medication 18 UNITS/KG/HR: at 18:49

## 2022-01-26 RX ADMIN — Medication 12.5 MCG/HR: at 22:51

## 2022-01-26 RX ADMIN — Medication 10 ML: at 22:33

## 2022-01-26 RX ADMIN — IOPAMIDOL 75 ML: 755 INJECTION, SOLUTION INTRAVENOUS at 17:09

## 2022-01-26 RX ADMIN — Medication 5 MCG/MIN: at 18:57

## 2022-01-26 RX ADMIN — PROPOFOL 25 MCG/KG/MIN: 10 INJECTION, EMULSION INTRAVENOUS at 15:50

## 2022-01-26 RX ADMIN — VANCOMYCIN HYDROCHLORIDE 1000 MG: 1 INJECTION, POWDER, LYOPHILIZED, FOR SOLUTION INTRAVENOUS at 23:36

## 2022-01-26 ASSESSMENT — PAIN SCALES - GENERAL
PAINLEVEL_OUTOF10: 0

## 2022-01-26 ASSESSMENT — PULMONARY FUNCTION TESTS
PIF_VALUE: 23
PIF_VALUE: 19
PIF_VALUE: 20
PIF_VALUE: 18

## 2022-01-26 NOTE — ED PROVIDER NOTES
I independently saw performed a substantive portion of the visit (history, physical, and MDM) on Odilia Mas. All diagnostic, treatment, and disposition decisions were made by myself in conjunction with the advanced practice provider. I have participated in the medical decision making and directed the treatment plan and disposition of the patient. For further details of [address]'s emergency department encounter, please see the advanced practice provider's documentation. CHIEF COMPLAINT  Chief Complaint   Patient presents with    Altered Mental Status     Arrived by private ambulance from [facility] rt call for AMS. Pt is alert & responsive at baseline; last known normal last night & non verbal at this time. Briefly, Odilia Mas is a 49 y.o. male  who presents to the ED complaining of altered mental status. Last known normal was sometime yesterday although history is severely limited. On arrival the patient has a GCS of 3. Report from the nursing home included the patient may have been \"faking it\" and has some \"psych history\" although I did not hear this myself from them directly. History is unobtainable from the patient himself regarding any precedent symptomatology. FOCUSED PHYSICAL EXAMINATION  BP (!) 162/129   Pulse 99   Temp 96.2 °F (35.7 °C) (Rectal)   Resp 16   Ht 6' (1.829 m)   Wt 132 lb 4.4 oz (60 kg)   SpO2 100%   BMI 17.94 kg/m²    Focused physical examination notable for GCS 3 as below. He has no corneal reflex on the left, positive on the right. He does have a gag reflex. Regular rate and rhythm. Minimal rhonchi. Agonal breathing, questionably protecting his airway. No apparent abdominal distention or pain to palpation. No painful response to stimulus in any of the extremities or spontaneous movements of the arms or legs although sometimes he groans and moans and tries to roll around a little bit.     GCS DURING INITIAL ASSESSMENT:  Eyes: +1 (no eye opening)  Verbal: +1 (none)  Motor: +1 (none)  Total: 3      The 12 lead EKG was interpreted by me as follows:  Rate: normal with a rate of 98  Rhythm: sinus  Axis: normal  Intervals: short MT, narrow QRS, normal QTc  ST segments: no ST elevations or depressions  T waves: no abnormal inversions  Non-specific T wave changes: present  Prior EKG comparison: EKG dated 8/15/19 is not significantly different      MDM:  Diagnostic considerations included stroke, TIA, intracranial bleed, trauma, infection/sepsis, medication side effect, intoxication/withdrawal, metabolic/electrolyte abnormalities    ED course was notable for acute encephalopathy with hypercapnia/CO2 narcosis as a possible  of his GCS of 3. He was intubated after initial assessment by the [facility] under my direct continuous bedside supervision, see her note for details. Patient did get transiently hypotensive after the etomidate but this was fluid responsive and no longer an issue afterwards. Patient had multiple peripheral IVs placed and IV fluids initiated. He did not have any response to ammonia prior to decision to intubate and his sugar was not low. His toxicologic work-up is essentially negative. Lactate is normal.  HCT nonacute. CTPE with PE, will heparinize and admit to ICU.     During the patient's ED course, the patient was given:  Medications   ammonia inhaler (has no administration in time range)   propofol injection (35 mcg/kg/min × 60 kg IntraVENous Rate/Dose Change 1/26/22 8713)   heparin (porcine) injection 4,800 Units (has no administration in time range)   heparin (porcine) injection 4,800 Units (has no administration in time range)   heparin (porcine) injection 2,400 Units (has no administration in time range)   heparin 25,000 units in dextrose 5% 250 mL (premix) infusion (has no administration in time range)   etomidate (AMIDATE) injection 20 mg (20 mg IntraVENous Given 1/26/22 1336)   rocuronium (ZEMURON) injection 100 mg (100 mg IntraVENous Given 1/26/22 1336)   0.9 % sodium chloride infusion (1,800 mLs IntraVENous New Bag 1/26/22 1351)   iopamidol (ISOVUE-370) 76 % injection 75 mL (75 mLs IntraVENous Given 1/26/22 1709)        CLINICAL IMPRESSION  1. Altered mental status, unspecified altered mental status type    2. Hypercapnia    3. CO2 narcosis    4. Multiple subsegmental pulmonary emboli without acute cor pulmonale (HCC)    5. Acute pulmonary embolism without acute cor pulmonale, unspecified pulmonary embolism type ([address]        DISPOSITION  Salbador Muller was admitted in fair condition. The plan is to admit to the hospital at this time under the hospitalist service. Hospitalist accepted the patient and will take over the patient's care. The total critical care time I independently spent while evaluating and treating this patient was 80 minutes. This excludes time spent doing separately billable procedures. This includes time at the bedside, data interpretation, medication management, obtaining critical history from collateral sources if the patient is unable to provide it directly, and physician consultation. Specifics of interventions taken and potentially life-threatening diagnostic considerations are listed above in the medical decision making. If this was a shared visit with an [facility], the time in this attestation is non-concurrent critical care time out of the total shared critical care time provided by the [facility] and myself. This chart was created using Dragon dictation software. Efforts were made by me to ensure accuracy, however some errors may be present due to limitations of this technology.             Estefanía Boyle MD  01/26/22 0958

## 2022-01-26 NOTE — ED PROVIDER NOTES
[facility]        Pt Name: Josey Jean  MRN: [id number]  [place] 1965  Date of evaluation: 1/26/2022  Provider: Dena Hester PA-C  PCP: No primary care provider on file. Note Started: 1:14 PM EST        I have seen and evaluated this patient with my supervising physician Honey Mcrae MD.    [address]       Chief Complaint   Patient presents with    Altered Mental Status     Arrived by private ambulance from [facility] rt call for AMS. Pt is alert & responsive at baseline; last known normal last night & non verbal at this time. HISTORY OF PRESENT ILLNESS   (Location, Timing/Onset, Context/Setting, Quality, Duration, Modifying Factors, Severity, Associated Signs and Symptoms)  Note limiting factors. Chief Complaint: Lakeshia Heaton is a 49 y.o. male who presents for evaluation of reported altered mental status. EMS reports that nursing home last seen normal last night. Currently he is normally verbal.  They also report patient has history of schizophrenia and has \"behaviors. \"  They are concerned that the patient may have had a stroke. No additional information is able to obtained at this time. Nursing Notes were all reviewed and agreed with or any disagreements were addressed in the HPI. REVIEW OF SYSTEMS    (2-9 systems for level 4, 10 or more for level 5)     Review of Systems   Unable to perform ROS: Patient unresponsive       Positives and Pertinent negatives as per HPI. Except as noted above in the ROS, all other systems were reviewed and negative.        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 Cardiovascular:      Rate and Rhythm: Normal rate and regular rhythm. Pulmonary:      Effort: Bradypnea present. No respiratory distress. Breath sounds: Decreased air movement present. Abdominal:      General: There is no distension. Palpations: Abdomen is soft. Tenderness: There is no abdominal tenderness. There is no guarding or rebound. Musculoskeletal:         General: Normal range of motion. Cervical back: Normal range of motion and neck supple. Skin:     General: Skin is warm and dry. Neurological:      Mental Status: He is unresponsive. GCS: GCS eye subscore is 1. GCS verbal subscore is 1. GCS motor subscore is 1.    Psychiatric:         Behavior: Behavior normal.         DIAGNOSTIC RESULTS   LABS:    Labs Reviewed   CBC WITH AUTO DIFFERENTIAL - Abnormal; Notable for the following components:       Result Value    Lymphocytes Absolute 0.9 (*)     All other components within normal limits    Narrative:     Performed at:  [facility] [address] [facility] [employer]   Phone [phone]   COMPREHENSIVE METABOLIC PANEL - Abnormal; Notable for the following components:    CO2 38 (*)     Glucose 166 (*)     BUN 24 (*)     CREATININE 0.6 (*)     Albumin/Globulin Ratio 1.0 (*)     All other components within normal limits    Narrative:     Performed at:  [facility]  [address] [facility], [address] [employer]   Phone [phone]   URINE RT REFLEX TO CULTURE - Abnormal; Notable for the following components:    Protein, UA 30 (*)     All other components within normal limits    Narrative:     Performed at:  [facility]  [address] [facility], [address] [employer]   Phone [phone]   ACETAMINOPHEN LEVEL - Abnormal; Notable for the following components:    Acetaminophen Level <5 (*)     All other components within normal limits    Narrative:     Performed at:  [facility] [address]. [facility], [facility] [employer]   Phone [phone]   SALICYLATE LEVEL - Abnormal; Notable for the following components:    Salicylate, Serum <9.7 (*)     All other components within normal limits    Narrative:     Performed at:  [facility] [address], [place], [facility] [employer]   Phone [phone]   BLOOD GAS, VENOUS - Abnormal; Notable for the following components:    pH, [place] 7.210 (*)     pCO2, [place] >98.3 (*)     pO2, [place] 102.0 (*)     HCO3, Venous 44.4 (*)     Base Excess, [place] 10.3 (*)     Carboxyhemoglobin 4.8 (*)     All other components within normal limits    Narrative:     Performed at:  [facility] [address], [place], [facility] [employer]   Phone [phone]   AMMONIA - Abnormal; Notable for the following components:    Ammonia 79 (*)     All other components within normal limits    Narrative:     Performed at:  [facility] [address], [place], [facility] [employer]   Phone [phone]   MICROSCOPIC URINALYSIS - Abnormal; Notable for the following components:    Hyaline Casts, UA 17 (*)     WBC, UA 6 (*)     Epithelial Cells, UA 6 (*)     All other components within normal limits    Narrative:     Performed at:  [facility] [address], [place], [facility] [employer]   Phone [phone]   POCT GLUCOSE - Abnormal; Notable for the following components:    POC Glucose 161 (*)     All other components within normal limits    Narrative:     Performed at:  [facility] [address], [place], [facility] [employer]   Phone [phone]   CULTURE, BLOOD 1   CULTURE, BLOOD 2   LIPASE    Narrative:     Performed at:  [facility] [address],  [place], [facility] [employer]   Phone [phone]   URINE DRUG SCREEN    Narrative:     Performed at:  [facility]  [address], [address]   Phone [phone]   TROPONIN    Narrative:     Performed at:  [facility]  [address],  [place], [address]   Phone [phone]   BRAIN NATRIURETIC PEPTIDE    Narrative:     Performed at:  [facility]  [address],  [place], [address]   Phone [phone]   ETHANOL    Narrative:     Performed at:  [facility]  [address],  [place], [address]   Phone [phone]   LACTATE, SEPSIS    Narrative:     Performed at:  [facility]  [address] [place],  [place], [address]   Phone [phone]   LACTATE, SEPSIS    Narrative:     Performed at:  [facility]  [address], [address]   Phone [phone]   CBC   APTT   APTT   TRIGLYCERIDES   APTT   POCT GLUCOSE       When ordered only abnormal lab results are displayed. All other labs were within normal range or not returned as of this dictation. EKG: When ordered, EKG's are interpreted by the Emergency Department Physician in the absence of a cardiologist.  Please see their note for interpretation of EKG. RADIOLOGY:   Non-plain film images such as CT, Ultrasound and MRI are read by the radiologist. Plain radiographic images are visualized and preliminarily interpreted by the ED Provider with the below findings:        Interpretation per the Radiologist below, if available at the time of this note:    CT CHEST PULMONARY EMBOLISM W CONTRAST   Final Result   Moderate acute pulmonary embolus to the pulmonary arteries to the left lower   lobe and a smaller pulmonary emboli scattered in the pulmonary arteries to   the right lower lobe.       Unremarkable thoracic aorta with no mediastinal mass or adenopathy and no   right heart significant right heart enlargement      ET tube and gastric tube in good position      Mild hyperinflation with no obvious acute pulmonary abnormality. The findings were called to Kishor [place]  at 5:40 p.m.         CT HEAD WO CONTRAST   Final Result   No hemorrhage or mass identified. There is cerebellar atrophy      Mild paranasal sinus disease      RECOMMENDATIONS:   Unavailable         XR ABDOMEN FOR NG/OG/NE TUBE PLACEMENT   Final Result   Tip of nasogastric tube is at the proximal stomach. Advancement of 8 cm is   suggested, in order for the distal side port of the tube to be intragastric. Mild to moderate gastric distention. XR CHEST PORTABLE   Final Result   ET tube terminates above the manasa. No results found. PROCEDURES   Unless otherwise noted below, none     Procedures  Intubation Procedure Note    Indication: impending respiratory failure and comatose state    Consent: Unable to be obtained due to the emergent nature of this procedure. Medications Used: etomidate intravenously and rocuronium intravenously    Procedure: The patient was placed in the appropriate position. Cricoid pressure was not required. Intubation was performed by direct laryngoscopy using a laryngoscope and a 7.5 cuffed endotracheal tube. The cuff was then inflated and the tube was secured appropriately at a distance of 23 cm to the dental ridge. Initial confirmation of placement included bilateral breath sounds, an end tidal CO2 detector, absence of sounds over the stomach, tube fogging and adequate pulse oximetry reading. A chest x-ray to verify correct placement of the tube showed appropriate tube position. The patient tolerated the procedure well. Complications: None      Venous Access Procedure Note  Indication: emergent need for intravenous access    Procedure: The patient was placed in the appropriate position and the skin over the puncture site was prepped with Chloraprep.  Intravenous access was obtained in a right forearm vein and the site was secured appropriately. The patient tolerated the procedure well. Complications: None        Central Line Placement Procedure Note    Indication: centrally administered medications    Consent: Unable to be obtained due to the emergent nature of this procedure. Procedure: The patient was positioned appropriately and the skin over the right femoral vein was prepped with Chloraprep and draped in a sterile fashion. Local anesthesia was not performed due to the emergent nature of this procedure. A large bore needle was used to identify the vein. A guide wire was then inserted into the vein through the needle. A triple lumen catheter was then inserted into the vessel over the guide wire using the Seldinger technique. All ports showed good, free flowing blood return and were flushed with saline solution. The catheter was then securely fastened to the skin with sutures and with an adhesive dressing and covered with a sterile dressing. A post procedure X-ray was not indicated. The patient tolerated the procedure well. Complications: None            CRITICAL CARE TIME   There was a high probability of life-threatening clinical deterioration in the patient's condition requiring my urgent intervention. I personally saw the patient and independently provided 75 minutes of non-concurrent critical care out of the total shared critical care time provided, excluding separately reportable procedures.            CONSULTS:  None      EMERGENCY DEPARTMENT COURSE and DIFFERENTIAL DIAGNOSIS/MDM:   Vitals:    Vitals:    01/26/22 1615 01/26/22 1630 01/26/22 1645 01/26/22 1745   BP: 102/85 91/69 97/84 84/64   Pulse: 104 105 102 96   Resp: 16 14 16 17   Temp:       TempSrc:       SpO2:    100%   Weight:       Height:           Patient was given the following medications:  Medications   ammonia inhaler (has no administration in time range)   propofol injection (35 mcg/kg/min × 60 kg IntraVENous Rate/Dose Change 1/26/22 6828) heparin (porcine) injection 4,800 Units (has no administration in time range)   heparin (porcine) injection 4,800 Units (has no administration in time range)   heparin (porcine) injection 2,400 Units (has no administration in time range)   heparin 25,000 units in dextrose 5% 250 mL (premix) infusion (has no administration in time range)   etomidate (AMIDATE) injection 20 mg (20 mg IntraVENous Given 1/26/22 1336)   rocuronium (ZEMURON) injection 100 mg (100 mg IntraVENous Given 1/26/22 1336)   0.9 % sodium chloride infusion (1,800 mLs IntraVENous New Bag 1/26/22 1351)   iopamidol (ISOVUE-370) 76 % injection 75 mL (75 mLs IntraVENous Given 1/26/22 1709)           Patient presents for evaluation of altered mental status. On exam, he is ill-appearing, cachectic, unresponsive, GCS 3.  Vitals otherwise stable and he is afebrile. Decision was made to intubate to protect airway due to unresponsive state. Corneal and gag reflexes are still intact. He has bradypnea and poor inspiratory effort with decreased breath sounds bilaterally. Abdomen benign. Please see attending note for EKG interpretation. Patient intubated per procedure note tolerated without difficulty. Peripheral access obtained. Patient was started on fluids, propofol and will be reevaluated. CBC and CMP are unremarkable. Lipase 49. Troponin is negative. BNP 45 acetaminophen, salicylate and ethanol are negative. Urinalysis negative. Lactic acid 0.8. Blood cultures are pending. VBG shows pH of 7.21 with a PCO2 greater than 98.3. CT the head shows no acute intracranial abnormality. CT PE is positive. Started on heparin. Hospitalist will resume care of the patient at this time. He is stable for admission. FINAL IMPRESSION      1. Altered mental status, unspecified altered mental status type    2. Hypercapnia    3. CO2 narcosis    4. Multiple subsegmental pulmonary emboli without acute cor pulmonale (HCC)    5.  Acute pulmonary embolism without acute cor pulmonale, unspecified pulmonary embolism type (HCC)          DISPOSITION/PLAN   DISPOSITION        PATIENT REFERRED TO:  No follow-up provider specified.     DISCHARGE MEDICATIONS:  New Prescriptions    No medications on file       DISCONTINUED MEDICATIONS:  Discontinued Medications    No medications on file              (Please note that portions of this note were completed with a voice recognition program.  Efforts were made to edit the dictations but occasionally words are mis-transcribed.)    Prince Vila PA-C (electronically signed)           Flakita Pittman PA-C  01/26/22 [address]  01/26/22 0089

## 2022-01-26 NOTE — H&P
Hospital Medicine History & Physical      PCP: No primary care provider on file. Date of Admission: 1/26/2022    Date of Service: Pt seen/examined on 1/26/2022 and Admitted to Inpatient with expected LOS greater than two midnights due to medical therapy. Chief Complaint: Unresponsiveness      History Of Present Illness: 49 y.o. male with past medical history of hypertension, HSV, schizoaffective disorder presents to the ED from nursing home in view of unresponsiveness. Last known well was yesterday. Found unresponsive this morning. Arrival to ED ED, multiple measures to wake up the patient failed. Per ED staff unable to protect airway. Was emergently intubated. CT head without acute findings. CTPA shows bilateral pulmonary emboli, started on heparin drip. Respiratory acidosis on blood gas. Hypertensive, central venous catheter inserted at the ED. 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  Authorizing Provider   ascorbic acid (VITAMIN C) 500 MG tablet Take 500 mg by mouth daily   Yes Historical Provider, MD   zinc gluconate 50 MG tablet Take 50 mg by mouth daily   Yes Historical Provider, MD   DULoxetine (CYMBALTA) 30 MG extended release capsule Take 30 mg by mouth daily    Historical Provider, MD   divalproex (DEPAKOTE ER) 500 MG extended release tablet Take 500 mg by mouth daily    Historical Provider, MD   amLODIPine (NORVASC) 10 MG tablet Take 10 mg by mouth daily Historical Provider, MD   tiZANidine (ZANAFLEX) 4 MG tablet Take 4 mg by mouth every 6 hours as needed    Historical Provider, MD   traZODone (DESYREL) 100 MG tablet Take 75 mg by mouth nightly    Historical Provider, MD   naproxen (NAPROSYN) 500 MG tablet Take 1 tablet by mouth 2 times daily for 20 doses 9/12/18 9/22/18  Betito Simmons PA-C       Allergies:  Patient has no known allergies. Social History:      The patient currently lives nursing home as per per report    TOBACCO:   reports that he has never smoked. He has never used smokeless tobacco.  ETOH:   reports no history of alcohol use. E-Cigarettes/Vaping Use     Questions Responses    E-Cigarette/Vaping Use     Start Date     Passive Exposure     Quit Date     Counseling Given     Comments             Family History:      Unable to obtain        Problem Relation Age of Onset    Diabetes Mother        REVIEW OF SYSTEMS:   Pertinent positives as noted in the HPI. All other systems reviewed and negative. PHYSICAL EXAM PERFORMED:    BP 84/64   Pulse 96   Temp 96.2 °F (35.7 °C) (Rectal)   Resp 17   Ht 6' (1.829 m)   Wt 132 lb 4.4 oz (60 kg)   SpO2 100%   BMI 17.94 kg/m²     Physical Exam  Vitals and nursing note reviewed. Constitutional:       General: He is not in acute distress. Appearance: He is normal weight. He is ill-appearing. He is not diaphoretic. HENT:      Head: Normocephalic and atraumatic. Eyes:      General: No scleral icterus. Cardiovascular:      Rate and Rhythm: Regular rhythm. Tachycardia present. Heart sounds: No murmur heard. No gallop. Pulmonary:      Comments: Endotracheally intubated and ventilated, FiO2 40%  Abdominal:      General: There is no distension. Tenderness: There is no guarding. Musculoskeletal:         General: No swelling. Right lower leg: No edema. Left lower leg: No edema. Skin:     General: Skin is warm and dry.       Coloration: Skin is not jaundiced or pale.   Neurological:      Comments: Patient is sedated           Labs:     Recent Labs     01/26/22  1315   WBC 8.7   HGB 16.2   HCT 51.3        Recent Labs     01/26/22  1315      K 5.0   CL 99   CO2 38*   BUN 24*   CREATININE 0.6*   CALCIUM 9.1     Recent Labs     01/26/22  1315   AST 32   ALT 40   BILITOT 0.5   ALKPHOS 67     No results for input(s): INR in the last 72 hours. Recent Labs     01/26/22  1315   TROPONINI <0.01       Urinalysis:      Lab Results   Component Value Date    NITRU Negative 01/26/2022    WBCUA 6 01/26/2022    RBCUA 3 01/26/2022    BLOODU Negative 01/26/2022    SPECGRAV 1.028 01/26/2022    GLUCOSEU Negative 01/26/2022    GLUCOSEU NEGATIVE 09/27/2010       Radiology:     CXR: I have reviewed the CXR with the following interpretation: Please see CT report  EKG:  I have reviewed the EKG with the following interpretation: NSR, no acute ischemic changes    CT CHEST PULMONARY EMBOLISM W CONTRAST   Final Result   Moderate acute pulmonary embolus to the pulmonary arteries to the left lower   lobe and a smaller pulmonary emboli scattered in the pulmonary arteries to   the right lower lobe. Unremarkable thoracic aorta with no mediastinal mass or adenopathy and no   right heart significant right heart enlargement      ET tube and gastric tube in good position      Mild hyperinflation with no obvious acute pulmonary abnormality. [facility]  [facility] 5:40 p.m.         CT HEAD WO CONTRAST   Final Result   No hemorrhage or mass identified. There is cerebellar atrophy      Mild paranasal sinus disease      RECOMMENDATIONS:   Unavailable         XR ABDOMEN FOR NG/OG/NE TUBE PLACEMENT   Final Result   Tip of nasogastric tube is at the proximal stomach. Advancement of 8 cm is   suggested, in order for the distal side port of the tube to be intragastric. Mild to moderate gastric distention.          XR CHEST PORTABLE   Final Result   ET tube terminates above the manasa. ASSESSMENT:    Active Hospital Problems    Diagnosis Date Noted    Pulmonary embolism, bilateral ([address] [I26.99] 01/26/2022         PLAN:    Bilateral pulmonary emboli  On heparin GTT  Was intubated in the ED  Echo  Vascular ultrasound  Vascular surgery consult    VDRF  Emergently intubated in the ED for airway protection in view of unresponsiveness  Intensivist consulted for management management    Acute hypercapnic respiratory failure  Intubated and ventilated  Intensivist is consulted    Unresponsiveness  CT head nonacute  We will need MRI  Neurology consulted    Shock  Cardiogenic?   Distributive  No signs of infection  CVC placed in the ED and started on Levophed  Empiric vancomycin and cefepime  Continuation per daytime team    DVT Prophylaxis: Heparin  Diet: No diet orders on file  Code Status: Prior      Electronically signed by Liv Linder MD on 1/26/2022 at 6:59 PM

## 2022-01-26 NOTE — ED NOTES
Arrived by private ambulance from [facility] rt call for AMS. Pt is alert & responsive at baseline; last known normal last night & non verbal at this time. Bridgette Ray Ra at bedside; responses with slight facial grimace to sternal rub. Monitors in place & iv line established. Dr Renny Matson made aware of pt status & decision made to intubate pt.        Marco A Alonso RN  01/26/22 7626

## 2022-01-26 NOTE — ED NOTES
Etomidate 20 mg given IVP under the supervision of Dr Connor Escobar for RSI  Roccuronium 100 mg given IVP under the supervision of Dr Connor Escobar for [address] [place] [address], RN  01/26/22 5556

## 2022-01-27 LAB
ANION GAP SERPL CALCULATED.3IONS-SCNC: 14 MMOL/L (ref 3–16)
APTT: 195.2 SEC (ref 26.2–38.6)
BASE EXCESS ARTERIAL: 11 (ref -3–3)
BASOPHILS ABSOLUTE: 0 K/UL (ref 0–0.2)
BASOPHILS RELATIVE PERCENT: 0.3 %
BUN BLDV-MCNC: 22 MG/DL (ref 7–20)
C-REACTIVE PROTEIN: 14 MG/L (ref 0–5.1)
CALCIUM IONIZED: 1.16 MMOL/L (ref 1.12–1.32)
CALCIUM IONIZED: 1.17 MMOL/L (ref 1.12–1.32)
CALCIUM SERPL-MCNC: 8.6 MG/DL (ref 8.3–10.6)
CHLORIDE BLD-SCNC: 103 MMOL/L (ref 99–110)
CO2: 29 MMOL/L (ref 21–32)
CREAT SERPL-MCNC: 0.7 MG/DL (ref 0.9–1.3)
EKG ATRIAL RATE: 98 BPM
EKG DIAGNOSIS: NORMAL
EKG P AXIS: 81 DEGREES
EKG P-R INTERVAL: 110 MS
EKG Q-T INTERVAL: 378 MS
EKG QRS DURATION: 90 MS
EKG QTC CALCULATION (BAZETT): 482 MS
EKG R AXIS: 80 DEGREES
EKG T AXIS: -66 DEGREES
EKG VENTRICULAR RATE: 98 BPM
EOSINOPHILS ABSOLUTE: 0 K/UL (ref 0–0.6)
EOSINOPHILS RELATIVE PERCENT: 0.4 %
GFR AFRICAN AMERICAN: >60
GFR NON-AFRICAN AMERICAN: >60
GLUCOSE BLD-MCNC: 119 MG/DL (ref 70–99)
GLUCOSE BLD-MCNC: 133 MG/DL (ref 70–99)
HCO3 ARTERIAL: 34.9 MMOL/L (ref 21–29)
HCT VFR BLD CALC: 42 % (ref 40.5–52.5)
HEMOGLOBIN: 13.6 G/DL (ref 13.5–17.5)
HEMOGLOBIN: 14.8 GM/DL (ref 13.5–17.5)
LACTATE: 1.18 MMOL/L (ref 0.4–2)
LV EF: 43 %
LVEF MODALITY: NORMAL
LYMPHOCYTES ABSOLUTE: 1.7 K/UL (ref 1–5.1)
LYMPHOCYTES RELATIVE PERCENT: 18.1 %
MAGNESIUM: 2 MG/DL (ref 1.8–2.4)
MCH RBC QN AUTO: 29.7 PG (ref 26–34)
MCHC RBC AUTO-ENTMCNC: 32.5 G/DL (ref 31–36)
MCV RBC AUTO: 91.4 FL (ref 80–100)
MONOCYTES ABSOLUTE: 0.7 K/UL (ref 0–1.3)
MONOCYTES RELATIVE PERCENT: 7.7 %
NEUTROPHILS ABSOLUTE: 6.7 K/UL (ref 1.7–7.7)
NEUTROPHILS RELATIVE PERCENT: 73.5 %
O2 SAT, ARTERIAL: 99 % (ref 93–100)
PCO2 ARTERIAL: 50.6 MM HG (ref 35–45)
PDW BLD-RTO: 14.2 % (ref 12.4–15.4)
PERFORMED ON: ABNORMAL
PH ARTERIAL: 7.45 (ref 7.35–7.45)
PH VENOUS: 7.45 (ref 7.35–7.45)
PHOSPHORUS: 2.5 MG/DL (ref 2.5–4.9)
PLATELET # BLD: 217 K/UL (ref 135–450)
PMV BLD AUTO: 9.9 FL (ref 5–10.5)
PO2 ARTERIAL: 124.7 MM HG (ref 75–108)
POC HEMATOCRIT: 44 % (ref 40.5–52.5)
POC POTASSIUM: 4.3 MMOL/L (ref 3.5–5.1)
POC SAMPLE TYPE: ABNORMAL
POC SODIUM: 148 MMOL/L (ref 136–145)
POTASSIUM SERPL-SCNC: 4.3 MMOL/L (ref 3.5–5.1)
PROCALCITONIN: 0.14 NG/ML (ref 0–0.15)
RBC # BLD: 4.59 M/UL (ref 4.2–5.9)
SARS-COV-2: DETECTED
SODIUM BLD-SCNC: 146 MMOL/L (ref 136–145)
TCO2 ARTERIAL: 36 MMOL/L
TRIGL SERPL-MCNC: 131 MG/DL (ref 0–150)
TROPONIN: 0.01 NG/ML
VANCOMYCIN RANDOM: 6.9 UG/ML
WBC # BLD: 9.2 K/UL (ref 4–11)

## 2022-01-27 PROCEDURE — 94761 N-INVAS EAR/PLS OXIMETRY MLT: CPT

## 2022-01-27 PROCEDURE — 93306 TTE W/DOPPLER COMPLETE: CPT

## 2022-01-27 PROCEDURE — 82947 ASSAY GLUCOSE BLOOD QUANT: CPT

## 2022-01-27 PROCEDURE — 6360000002 HC RX W HCPCS: Performed by: INTERNAL MEDICINE

## 2022-01-27 PROCEDURE — 84145 PROCALCITONIN (PCT): CPT

## 2022-01-27 PROCEDURE — 85014 HEMATOCRIT: CPT

## 2022-01-27 PROCEDURE — 83605 ASSAY OF LACTIC ACID: CPT

## 2022-01-27 PROCEDURE — 2580000003 HC RX 258: Performed by: INTERNAL MEDICINE

## 2022-01-27 PROCEDURE — 36592 COLLECT BLOOD FROM PICC: CPT

## 2022-01-27 PROCEDURE — 99223 1ST HOSP IP/OBS HIGH 75: CPT | Performed by: PSYCHIATRY & NEUROLOGY

## 2022-01-27 PROCEDURE — 84132 ASSAY OF SERUM POTASSIUM: CPT

## 2022-01-27 PROCEDURE — APPNB30 APP NON BILLABLE TIME 0-30 MINS: Performed by: NURSE PRACTITIONER

## 2022-01-27 PROCEDURE — 82803 BLOOD GASES ANY COMBINATION: CPT

## 2022-01-27 PROCEDURE — 93010 ELECTROCARDIOGRAM REPORT: CPT | Performed by: INTERNAL MEDICINE

## 2022-01-27 PROCEDURE — 85025 COMPLETE CBC W/AUTO DIFF WBC: CPT

## 2022-01-27 PROCEDURE — 82330 ASSAY OF CALCIUM: CPT

## 2022-01-27 PROCEDURE — 83735 ASSAY OF MAGNESIUM: CPT

## 2022-01-27 PROCEDURE — 84100 ASSAY OF PHOSPHORUS: CPT

## 2022-01-27 PROCEDURE — C9113 INJ PANTOPRAZOLE SODIUM, VIA: HCPCS | Performed by: INTERNAL MEDICINE

## 2022-01-27 PROCEDURE — 2700000000 HC OXYGEN THERAPY PER DAY

## 2022-01-27 PROCEDURE — 80048 BASIC METABOLIC PNL TOTAL CA: CPT

## 2022-01-27 PROCEDURE — 84484 ASSAY OF TROPONIN QUANT: CPT

## 2022-01-27 PROCEDURE — 86140 C-REACTIVE PROTEIN: CPT

## 2022-01-27 PROCEDURE — 99291 CRITICAL CARE FIRST HOUR: CPT | Performed by: INTERNAL MEDICINE

## 2022-01-27 PROCEDURE — 80202 ASSAY OF VANCOMYCIN: CPT

## 2022-01-27 PROCEDURE — 93970 EXTREMITY STUDY: CPT

## 2022-01-27 PROCEDURE — 94002 VENT MGMT INPAT INIT DAY: CPT

## 2022-01-27 PROCEDURE — APPSS60 APP SPLIT SHARED TIME 46-60 MINUTES: Performed by: NURSE PRACTITIONER

## 2022-01-27 PROCEDURE — 2000000000 HC ICU R&B

## 2022-01-27 PROCEDURE — 84295 ASSAY OF SERUM SODIUM: CPT

## 2022-01-27 RX ORDER — PANTOPRAZOLE SODIUM 40 MG/10ML
40 INJECTION, POWDER, LYOPHILIZED, FOR SOLUTION INTRAVENOUS DAILY
Status: DISCONTINUED | OUTPATIENT
Start: 2022-01-27 | End: 2022-01-30 | Stop reason: HOSPADM

## 2022-01-27 RX ADMIN — ENOXAPARIN SODIUM 50 MG: 100 INJECTION SUBCUTANEOUS at 05:28

## 2022-01-27 RX ADMIN — Medication 10 ML: at 09:52

## 2022-01-27 RX ADMIN — ENOXAPARIN SODIUM 50 MG: 100 INJECTION SUBCUTANEOUS at 20:16

## 2022-01-27 RX ADMIN — CEFEPIME HYDROCHLORIDE 2000 MG: 2 INJECTION, POWDER, FOR SOLUTION INTRAVENOUS at 09:51

## 2022-01-27 RX ADMIN — Medication 25 MCG/HR: at 20:16

## 2022-01-27 RX ADMIN — PANTOPRAZOLE SODIUM 40 MG: 40 INJECTION, POWDER, FOR SOLUTION INTRAVENOUS at 09:52

## 2022-01-27 RX ADMIN — Medication 25 MCG/HR: at 11:51

## 2022-01-27 RX ADMIN — PROPOFOL 30 MCG/KG/MIN: 10 INJECTION, EMULSION INTRAVENOUS at 05:01

## 2022-01-27 RX ADMIN — DEXAMETHASONE SODIUM PHOSPHATE 20 MG: 4 INJECTION, SOLUTION INTRA-ARTICULAR; INTRALESIONAL; INTRAMUSCULAR; INTRAVENOUS; SOFT TISSUE at 11:50

## 2022-01-27 RX ADMIN — Medication 10 ML: at 20:16

## 2022-01-27 RX ADMIN — PROPOFOL 10 MCG/KG/MIN: 10 INJECTION, EMULSION INTRAVENOUS at 17:20

## 2022-01-27 ASSESSMENT — PULMONARY FUNCTION TESTS
PIF_VALUE: 23
PIF_VALUE: 20
PIF_VALUE: 17
PIF_VALUE: 23
PIF_VALUE: 20
PIF_VALUE: 18
PIF_VALUE: 17
PIF_VALUE: 22
PIF_VALUE: 19
PIF_VALUE: 20
PIF_VALUE: 21

## 2022-01-27 ASSESSMENT — PAIN SCALES - GENERAL
PAINLEVEL_OUTOF10: 0

## 2022-01-27 NOTE — PLAN OF CARE
Nutrition Problem #1: Inadequate oral intake  Intervention: Food and/or Nutrient Delivery: Continue NPO (start nutrition as medically appropriate)  Nutritional Goals: start of nutrition

## 2022-01-27 NOTE — CONSULTS
In patient Neurology consult        [facility] Neurology      MD Collette Maher  1965    Date of Service: 1/27/2022    Referring Physician: Sameera Iqbal MD      Reason for the consult and CC: Acute encephalopathy. HPI:   Most of the history was obtained from chart reviewing and discussion with the patient's nurse as the patient is currently intubated and sedated. The patient is a 49y.o. years old male with multiple medical problems who was admitted yesterday from his nursing home after being found unresponsive. Unclear duration but could be hours. Degree was severe. Description found unresponsive but no clear triggers, witnessed seizure or focal weakness. No other relieving or aggravating factors or clear triggers. He came to the ED where he was eventually intubated for airway protection. Initial imaging showed bilateral PE and he was placed on heparin drip. Patient is currently intubated and sedated. Has brainstem function. Other review of system was limited.     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    Schizoaffective disorder ([address]     Strabismus     Vitamin D deficiency 10/27/2014     Past Surgical History:   Procedure Laterality Date    ANKLE FRACTURE SURGERY Right 12/21/2017     RIGHT ANKLE OPEN REDUCTION INTERNAL FIXATION     Social History     Tobacco Use    Smoking status: Never Smoker    Smokeless tobacco: Never Used   Substance Use Topics    Alcohol use: No     Comment: QUIT DRINKING 2013    Drug use: Not Currently     No Known Allergies  Current Facility-Administered Medications   Medication Dose Route Frequency Provider Last Rate Last Admin    vancomycin (VANCOCIN) 1,250 mg in dextrose 5 % 250 mL IVPB  1,250 mg IntraVENous Q12H Julito Mathias MD        enoxaparin (LOVENOX) injection 50 mg  1 mg/kg SubCUTAneous BID Vinh Stafford MD   50 mg at 01/27/22 0528    pantoprazole (PROTONIX) injection 40 mg  40 mg IntraVENous Daily Erby Hodgkin, MD   40 mg at 01/27/22 4878    dexamethasone (DECADRON) 20 mg in sodium chloride 0.9 % IVPB  20 mg IntraVENous Daily Diogenes Clark MD   Stopped at 01/27/22 1226    propofol injection  5-50 mcg/kg/min IntraVENous Titrated Julito Mathias MD   Stopped at 01/27/22 0936    norepinephrine (LEVOPHED) 16 mg in sodium chloride 0.9 % 250 mL infusion  2-100 mcg/min IntraVENous Continuous Julito Mathias MD   Stopped at 01/27/22 1158    sodium chloride flush 0.9 % injection 5-40 mL  5-40 mL IntraVENous 2 times per day Yolanda STAPLES MD   10 mL at 01/27/22 0952    sodium chloride flush 0.9 % injection 5-40 mL  5-40 mL IntraVENous PRN Julito STAPLES MD        0.9 % sodium chloride infusion  25 mL IntraVENous PRN Yolanda STAPLES MD   Stopped at 01/26/22 2336    ondansetron (ZOFRAN-ODT) disintegrating tablet 4 mg  4 mg Oral Q8H PRN Julito Mathias MD        Or    ondansetron (ZOFRAN) injection 4 mg  4 mg IntraVENous Q6H PRN Julito STAPLES MD        polyethylene glycol (GLYCOLAX) packet 17 g  17 g Oral Daily PRN Julito Mathias MD        acetaminophen (TYLENOL) tablet 650 mg  650 mg Oral Q6H PRN Julito STAPLES MD        Or    acetaminophen (TYLENOL) suppository 650 mg  650 mg Rectal Q6H PRN Julito STAPLES MD        perflutren lipid microspheres (DEFINITY) injection 1.65 mg  1.5 mL IntraVENous ONCE PRN Julito STAPLES MD        cefepime (MAXIPIME) 2000 mg IVPB minibag  2,000 mg IntraVENous Q12H Julito STAPLES MD   Stopped at 01/27/22 1021    midazolam (VERSED) 1 mg/mL in D5W infusion  1-10 mg/hr IntraVENous Continuous Vinh MD Rivera   Stopped at 01/27/22 0936    fentaNYL 10 mcg/mL infusion  12.5-200 mcg/hr IntraVENous Continuous Vinh MD Rivera 2.5 mL/hr Component Value Date    WBC 9.2 01/27/2022    RBC 4.59 01/27/2022    HGB 14.8 01/27/2022    HCT 42.0 01/27/2022    MCV 91.4 01/27/2022    RDW 14.2 01/27/2022     01/27/2022     Lab Results   Component Value Date    INR 1.19 (H) 12/21/2017    PROTIME 13.5 (H) 12/21/2017       Neuroimaging were independently reviewed by me  Reviewed notes from different physicians  Reviewed lab and blood testing    Impression:  Acute encephalopathy, severe. Could be acute encephalopathy secondary to respiratory failure and hypoxia from bilateral PE. Acute respiratory failure with hypoxia  Bilateral PE  Rule out sepsis  Hypernatremia    Recommendation:  Continue respiratory support  Continue current supportive care  Anticoagulation  Droplet isolation  COVID-19 rule out  Wean sedation medically stable  Continue Decadron  Antibiotics  Hydration  Follow sodium level and CMP  Discussed with his nurse  Poor prognosis  Further recommendation to follow after weaning sedation      Thank you for referring such patient. If you have any questions regarding my consult note, please don't hesitate to call me. Swathi Martin MD  [phone]    This dictation was generated by voice recognition computer software.  Although all attempts are made to edit the dictation for accuracy, there may be errors in the  transcription that are not intended

## 2022-01-27 NOTE — CARE COORDINATION
Discharge Planning:    Chart review states that patient is a resident at [facility].  (CM) called [facility] admissions staff, Jarod Noriega ([phone]) who stated that patient is a Long-Term Care resident and can return to [facility] upon discharge.     Fernandez MURILLO, [facility]    [phone]    Electronically signed by CHIDI Allan on 1/27/2022 at 8:49 AM

## 2022-01-27 NOTE — PROGRESS NOTES
01/27/22 0758   Vent Patient Data   Plateau Pressure 15 [id number]   Static Compliance 54.8 mL/cmH2O   Dynamic Compliance 49.8 mL/cmH2O

## 2022-01-27 NOTE — PROGRESS NOTES
Clinical Pharmacy Note: Pharmacy to Dose Vancomycin    Kurt Vasquez is a 49 y.o. male started on Vancomycin; consult received from Dr. Shellie Jacobs to manage therapy. Also receiving the following antibiotics: cefepime. Goal AUC: 400-600 mg/L*hr  Goal Trough Level: 15-20 mcg/mL    Assessment/Plan:  A 1000 mg loading dose was given on 1/26 at 2337  Initiate vancomycin 1250 mg IV every 12 hours. Bayesian modeling predicts an AUC of 554 mg/L*hr and a trough of 15.7 mcg/mL at steady state concentration. A vancomycin random level has been ordered for 1/27 at 0600  Changes in regimen will be determined based on culture results, renal function, and clinical response. Pharmacy will continue to monitor and adjust regimen as necessary. Allergies:  Patient has no known allergies. Recent Labs     01/26/22  1315   CREATININE 0.6*       Recent Labs     01/26/22  1315 01/26/22  2227   WBC 8.7 8.1       Ht Readings from Last 1 Encounters:   01/26/22 6' (1.829 m)        Wt Readings from Last 1 Encounters:   01/26/22 103 lb 3.2 oz (46.8 kg)         CrCl cannot be calculated (Unknown ideal weight.).       Thank you for the consult,    Kavin Cornejo PharmD

## 2022-01-27 NOTE — PROGRESS NOTES
Patient is intubated and sedated on the ventilator patient is currently in vent synchrony. Patient has fentanyl propofol levo versed and a kvo. Patient skin is clean dry and intact. Patient is extremely malnourished.

## 2022-01-27 NOTE — CONSULTS
Palliative Care:  49 y.o. male with past medical history of hypertension, HSV, schizoaffective disorder presents to the ED from nursing home in view of unresponsiveness. Last known well was yesterday. Found unresponsive this morning. Arrival to ED ED, multiple measures to wake up the patient failed. Per ED staff unable to protect airway. Was emergently intubated. CT head without acute findings. CTPA shows bilateral pulmonary emboli, started on heparin drip. Respiratory acidosis on blood gas. Hypertensive, central venous catheter inserted at the ED. Patient admitted. Intubated/ventilator support. Palliative consult placed 1/26/21. Past Medical History:   has a past medical history of Ataxia, Cerebellar atrophy ([address] Dysarthria, Herpes simplex, Hypertension, Ophthalmoplegia, Schizoaffective disorder ([address] Strabismus, and Vitamin D deficiency. Past Surgical History:   has a past surgical history that includes Ankle fracture surgery (Right, 12/21/2017). Advance Directives:    Full code. No ACP in place. Only one son  Carlota Steven. Notified of admission. Problem Severity: Pain/Other Symptoms:   Intubated/ventilator support. Bed Mobility/Toileting/Transfer:   Full assist at this time. Performance Status:        Palliative Performance Scale:  100% []Full Normal activity & work No evidence of disease  90%   [] Full Normal activity & work Some evidence of disease  80%   [] Full Normal activity with Effort Some evidence of disease  70%   [] Reduced Unable Normal Job/Work Significant disease Full Normal or reduced  60%   [] Ambulation reduced; Significant disease; Can't do hobbies/housework; intake normal   or reduced; occasional assist; LOC full/confusion  50%   [] Mainly sit/lie;  Extensive disease; Can't do any work; Considerable assist; intake normal  Or reduced; LOC full/confusion  40%   [] Mainly in bed; Extensive disease; Mainly assist; intake normal or reduced; occasional

## 2022-01-27 NOTE — CONSULTS
PULMONARY AND CRITICAL CARE MEDICINE CONSULT NOTE      Name: Yoandy Link  Sex: male  : 1965  MRN: [id number]  Admission Date: 2022  Admission Diagnosis: Hypercapnia [R06.89]  Pulmonary embolism, bilateral (HCC) [I26.99]  CO2 narcosis [R06.89]  Altered mental status, unspecified altered mental status type [R41.82]  Acute pulmonary embolism without acute cor pulmonale, unspecified pulmonary embolism type (HCC) [I26.99]  Multiple subsegmental pulmonary emboli without acute cor pulmonale (HCC) [I26.94]  Date of ICU admission: 2022    Reason for ICU admission: Respiratory failure    HPI: Patient is a 49 y.o. male with past medical history significant for hypertension, schizoaffective disorder who lives at [facility] and was in isolation over therefore been tested positive for Covid presented to the hospital with complaints of altered mental status. In the ER patient was found to be completely unresponsive. He was intubated for airway protection. Stat ABG was performed that was suggestive of acute hypercapnic respiratory failure. Patient also underwent CTA chest that showed bilateral PE for which she was initiated on heparin drip which now has been switched to Lovenox twice daily. Patient's son denies any previous history of blood clots. He stated that patient was in isolation at [facility] for being tested positive for Covid roughly 10 days ago. Patient is very malnourished and cachectic. 14 point ROS could not be obtained due to patient factors.          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    Schizoaffective disorder ([address]     Strabismus     Vitamin D deficiency 10/27/2014     Past Surgical History:   Procedure Laterality Date    ANKLE FRACTURE SURGERY Right 2017     RIGHT ANKLE OPEN REDUCTION INTERNAL FIXATION     Social History     Socioeconomic History    Marital status:      Spouse name: Not on file    Number of children: 1    Years of education: Not on file    Highest education level: Not on file   Occupational History    Not on file   Tobacco Use    Smoking status: Never Smoker    Smokeless tobacco: Never Used   Substance and Sexual Activity    Alcohol use: No     Comment: QUIT DRINKING 2013    Drug use: Not Currently    Sexual activity: Not Currently     Partners: Female   Other Topics Concern    Not on file   Social History Narrative        He was in [facility] for aggravated burglary that he committed in 200. He avoided the police for four to five years. He was in senior living from 20 to [address] [place] [address] Social Determinants of Health     Financial Resource Strain:     Difficulty of Paying Living Expenses: Not on file   Food Insecurity:     Worried About Running Out of Food in the Last Year: Not on file    Faith of Food in the Last Year: Not on file   Transportation Needs:     Lack of Transportation (Medical): Not on file    Lack of Transportation (Non-Medical):  Not on file   Physical Activity:     Days of Exercise per Week: Not on file    Minutes of Exercise per Session: Not on file   Stress:     Feeling of Stress : Not on file   Social Connections:     Frequency of Communication with Friends and Family: Not on file    Frequency of Social Gatherings with Friends and Family: Not on file    Attends Christian Services: Not on file    Active Member of Clubs or Organizations: Not on file    Attends Club or Organization Meetings: Not on file    Marital Status: Not on file   Intimate Partner Violence:     Fear of Current or Ex-Partner: Not on file    Emotionally Abused: Not on file    Physically Abused: Not on file    Sexually Abused: Not on file   Housing Stability:     Unable to Pay for Housing in the Last Year: Not on file    Number of [place] in the Last Year: Not on file    Unstable Housing in the Last Year: Not on file     Family History   Problem Relation Age of Onset    Diabetes Mother      No Known Allergies    MEDICATIONS:     Scheduled Meds:     vancomycin  1,250 mg IntraVENous Q12H    enoxaparin  1 mg/kg SubCUTAneous BID    pantoprazole  40 mg IntraVENous Daily    dexamethasone (DECADRON) IVPB  20 mg IntraVENous Daily    sodium chloride flush  5-40 mL IntraVENous 2 times per day    cefepime  2,000 mg IntraVENous Q12H     Current Infusions:    propofol Stopped (01/27/22 0936)    norepinephrine Stopped (01/27/22 1158)    sodium chloride Stopped (01/26/22 2336)    midazolam Stopped (01/27/22 0936)    fentaNYL 25 mcg/hr (01/27/22 1153)       PRN meds:  sodium chloride flush, sodium chloride, ondansetron **OR** ondansetron, polyethylene glycol, acetaminophen **OR** acetaminophen, perflutren lipid microspheres    PHYSICAL EXAM:    /89   Pulse 108   Temp 100.6 °F (38.1 °C) (Core)   Resp 16   Ht 6' (1.829 m)   Wt 103 lb 3.2 oz (46.8 kg)   SpO2 100%   BMI 14.00 kg/m²  I/O last 3 completed shifts: In: 1579.4 [I.V.:1279.4; IV Piggyback:300]  Out: 1000 [Urine:1000] I/O this shift:  In: 338.8 [I.V.:230; IV Piggyback:108.8]  Out: -       Intake/Output Summary (Last 24 hours) at 1/27/2022 1458  Last data filed at 1/27/2022 1153  Gross per 24 hour   Intake 1918.11 ml   Output 1000 ml   Net 918.11 ml         CONSTITUTIONAL: He is a 49y.o.-year-old who appears his stated age. He is in no acute distress. Cachexia  CARDIOVASCULAR: S1 S2 RRR. Without murmer  RESPIRATORY & CHEST: Lungs are clear to auscultation and percussion. No wheezing, no crackles. Good air movement  GASTROINTESTINAL & ABDOMEN: Soft, nontender, positive bowel sounds in all quadrants, non-distended, without hepatosplenomegaly. GENITOURINARY: Deferred. MUSCULOSKELETAL: No tenderness to palpation of the axial skeleton. There is no clubbing. No cyanosis.  No edema of the lower extremities. SKIN OF BODY: No rash or jaundice. PSYCHIATRIC EVALUATION: Could not be assessed  HEMATOLOGIC/LYMPHATIC/ IMMUNOLOGIC: No palpable lymphadenopathy. NEUROLOGIC: Sedated. Groslly non-focal.     LABS:    Recent Labs     01/26/22  1315 01/26/22  1315 01/26/22 2227 01/27/22  0430 01/27/22  1010   WBC 8.7  --  8.1 9.2  --    RBC 5.47  --  4.62 4.59  --    HGB 16.2   < > 13.6 13.6 14.8   HCT 51.3  --  42.6 42.0  --    MCH 29.6  --  29.4 29.7  --    MCHC 31.6  --  31.9 32.5  --    RDW 14.5  --  14.1 14.2  --      --  211 217  --    MPV 9.5  --  10.1 9.9  --    NEUTOPHILPCT 81.0  --   --  73.5  --    MONOPCT 8.0  --   --  7.7  --    BASOPCT 0.4  --   --  0.3  --     < > = values in this interval not displayed. Recent Labs     01/26/22  1315 01/27/22  0430    146*   K 5.0 4.3   CL 99 103   ANIONGAP 7 14   CO2 38* 29   BUN 24* 22*   CREATININE 0.6* 0.7*   CALCIUM 9.1 8.6   PROT 7.6  --    BILITOT 0.5  --    ALKPHOS 67  --    ALT 40  --    AST 32  --    GLUCOSE 166* 133*     Recent Labs     01/27/22  1010   PHART 7.446   GFU5EFD 50.6*   PO2ART 124.7*   FCQ3GOG 34.9*   N2GEHKWA 99   BEART 11*       Recent Labs     01/26/22  1315 01/26/22  2225 01/27/22  0130   TROPONINI <0.01 0.02* 0.01     Recent Labs     01/27/22  0420   CRP 14.0*       IMAGING:  I reviewed the CT chest and my interpretation is as follows. Pulmonary embolism involving pulmonary artery of the left lower lobe and right lower lobe. IMPRESSION:  Acute bilateral PE  COVID-19 rule out  Acute hypercapnic and hypoxic respiratory failure  Severe protein calorie malnutrition  Schizoaffective disorder      PLAN:  Patient is sedated with propofol and fentanyl. Titrate sedation for RASS of -1 to +1. Presented with acute hypoxic and hypercapnic respiratory failure. Noted to have bilateral PE likely precipitating cause for respiratory failure. Patient does not have any history of COPD documented.   I reviewed the images myself and I do not think that patient is a candidate for EKOS. Patient had minimal troponinemia with normal proBNP. No RV strain noted. However, vascular surgery consultation is pending. Continue therapeutic Lovenox. Noted to have bleeding femoral central line which is improved now. Severe protein calorie malnutrition. Patient lives in [facility]. He looks very cachectic and malnourished. Patient is a COVID-19 rule out. As per son, patient was in Covid isolation of at [facility]. CRP minimally elevated at 14. Decadron initiated at 20 mg IV daily. Patient is on empiric antibiotics with vancomycin and cefepime. CT does not show any pneumonia. Urinalysis is negative. Procalcitonin normal.  Antibiotics can be discontinued. Protonix for stress ulcer prophylaxis. Hold sedation tomorrow morning for likely vent wean. Patient son was updated by me in the hallway. Critical Care Time: 39 Minutes  Total critical care time caring for this patient with life threatening illness, including direct patient contact, management of life support systems, review of data including imaging and labs, discussions with other team members and physicians excluding procedures. Electronically signed by Minerva Swenson MD on 1/27/22 at 2:58 PM EST     This note was completed using dragon medical speech recognition software. Grammatical errors, random word insertions, pronoun errors and incomplete sentences are occasional consequences of this technology due to software limitations. If there are questions or concerns about the content of this note of information contained within the body of this dictation, they should be addressed with the provider for clarification.

## 2022-01-27 NOTE — PROGRESS NOTES
Patient has been bleeding profusely from femoral line since admission to the floor patient on heparin drip for PE. Dr Hina Garcia stopped the heparin and orders placed for lovenox. Bleeding has since slowed and is almost stopped.  Will continue to monitor

## 2022-01-27 NOTE — PROGRESS NOTES
Comprehensive Nutrition Assessment    Type and Reason for Visit:  Initial (new Vent)    Nutrition Recommendations/Plan:   Start nutrition as medically appropriate    Nutrition Assessment:  Pt is at risk for nutrition compromise AEB NPO status d/t intubation. NG tube to low intermittent suction, with coffee ground, brown drainage noted. Pt on 6 mcg/min levo. Pt has a very low BMI (14) but no hx to review for significant changes. Suspect poor po intake prior to admission d/t COVID 19. Will monitor for nutrition to start as medically appropriate. Malnutrition Assessment:  Malnutrition Status:  Insufficient data      Estimated Daily Nutrient Needs:  Energy (kcal):  1175- 1410 (25-30 kcal/47kg); Weight Used for Energy Requirements:  Current     Protein (g):  56- 94g (1.2-2.0g/47kg); Weight Used for Protein Requirements:  Current        Fluid (ml/day):   ; Method Used for Fluid Requirements:  1 ml/kcal      Nutrition Related Findings:  No edema noted;  Na 146      Wounds:  None       Current Nutrition Therapies:    Diet NPO    Anthropometric Measures:  · Height: 6' (182.9 cm)  · Current Body Weight: 103 lb (46.7 kg)   · Admission Body Weight:      · Usual Body Weight:       · Ideal Body Weight: 178 lbs; % Ideal Body Weight 57.9 %   · BMI: 14  · Adjusted Body Weight:  ; No Adjustment   · Adjusted BMI:      · BMI Categories: Underweight (BMI less than 18.5)       Nutrition Diagnosis:   · Inadequate oral intake related to impaired respiratory function as evidenced by intubation      Nutrition Interventions:   Food and/or Nutrient Delivery:  Continue NPO (start nutrition as medically appropriate)  Nutrition Education/Counseling:  Education not indicated   Coordination of Nutrition Care:  Continue to monitor while inpatient    Goals:  start of nutrition       Nutrition Monitoring and Evaluation:   Behavioral-Environmental Outcomes:  None Identified   Food/Nutrient Intake Outcomes:  Diet Advancement/Tolerance  Physical Signs/Symptoms Outcomes:  Biochemical Data,Hemodynamic Status,Weight     Discharge Planning:     Too soon to determine     Electronically signed by Lincoln Archer RD, LD on 1/27/22 at 12:56 PM EST    Contact: [phone]
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
## 2022-01-27 NOTE — CONSULTS
Palliative Care:     Kiran Veloz came to Team rounds. Overall update of current status provided. Son states that tomorrow 1/28/22 would be day 10 he was tested as positive at [address] and was going to be out of isolation status. Son was not aware he was hospitalized. Agrees with full code at this time. Routines of unit provided and contact information for son to call when needed. Palliative will continue to follow.

## 2022-01-27 NOTE — PROGRESS NOTES
01/26/22 2131   Vent Patient Data   Plateau Pressure 16 GYB00   Static Compliance 50.2 mL/cmH2O   Dynamic Compliance 36.06 mL/cmH2O   Total PEEP 5.8 cmH20   Auto PEEP 0.8 cmH20

## 2022-01-27 NOTE — PROGRESS NOTES
Clinical Pharmacy Note: Pharmacy to Dose Vancomcyin    Vancomycin Day: 2  Current Dosing Regimen: 1250 mg IV every 12 hours  Dosing Method: Bayesian Modeling    Random: 6.9    Recent Labs     01/26/22  1315 01/27/22  0430   BUN 24* 22*       Recent Labs     01/26/22  1315 01/27/22  0430   CREATININE 0.6* 0.7*       Recent Labs     01/26/22  2227 01/27/22  0430   WBC 8.1 9.2         Intake/Output Summary (Last 24 hours) at 1/27/2022 0741  Last data filed at 1/27/2022 0606  Gross per 24 hour   Intake 1579.35 ml   Output 1000 ml   Net 579.35 ml         Ht Readings from Last 1 Encounters:   01/26/22 6' (1.829 m)        Wt Readings from Last 1 Encounters:   01/27/22 103 lb 3.2 oz (46.8 kg)         Body mass index is 14 kg/m². CrCl cannot be calculated (Unknown ideal weight. ). Assessment/Plan:  Vancomycin level is therapeutic. Level was drawn appropriately in respect to last dose given. Continue vancomycin regimen of 1250 mg IV every very 12 hours. Bayesian Modeling predicts an AUC of 526 mg/L*hr and trough of 15.7 mg/L. No repeat vancomycin levels have been ordered at this time. Changes in regimen will be determined based on culture results, renal function, and clinical response. Pharmacy will continue to monitor and adjust regimen as necessary.     Thank you for the consult,    Gauri Michael, PharmD, [address] [facility] Pharmacist  [id number]

## 2022-01-27 NOTE — PROGRESS NOTES
4 Eyes Admission Assessment     I agree as the admission nurse that 2 RN's have performed a thorough Head to Toe Skin Assessment on the patient. ALL assessment sites listed below have been assessed on admission. Areas assessed by both nurses:   [x]   Head, Face, and Ears   [x]   Shoulders, Back, and Chest  [x]   Arms, Elbows, and Hands   [x]   Coccyx, Sacrum, and Ischium  [x]   Legs, Feet, and Heels        Does the Patient have Skin Breakdown?   No patient is extremely bony and at high risk for skin breakdown         Tj Prevention initiated:  Yes   Wound Care Orders initiated:  No      [facility] nurse consulted for Pressure Injury (Stage 3,4, Unstageable, DTI, NWPT, and Complex wounds) or Tj score 18 or lower:  No      Nurse 1 eSignature: Electronically signed by Wayne Arrieta RN on 1/26/22 at 11:58 PM EST    **SHARE this note so that the co-signing nurse is able to place an eSignature**    Nurse 2 eSignature: Electronically signed by Eve Graham RN on 1/27/22 at 1:11 AM EST

## 2022-01-28 ENCOUNTER — APPOINTMENT (OUTPATIENT)
Dept: GENERAL RADIOLOGY | Age: 57
DRG: 208 | End: 2022-01-28
Payer: COMMERCIAL

## 2022-01-28 LAB
ANION GAP SERPL CALCULATED.3IONS-SCNC: 8 MMOL/L (ref 3–16)
BASE EXCESS ARTERIAL: 10 MMOL/L (ref -3–3)
BASOPHILS ABSOLUTE: 0 K/UL (ref 0–0.2)
BASOPHILS RELATIVE PERCENT: 0.3 %
BUN BLDV-MCNC: 29 MG/DL (ref 7–20)
CALCIUM IONIZED: 1.12 MMOL/L (ref 1.12–1.32)
CALCIUM SERPL-MCNC: 9.2 MG/DL (ref 8.3–10.6)
CARBOXYHEMOGLOBIN ARTERIAL: 0.1 % (ref 0–1.5)
CHLORIDE BLD-SCNC: 106 MMOL/L (ref 99–110)
CO2: 35 MMOL/L (ref 21–32)
CREAT SERPL-MCNC: 0.7 MG/DL (ref 0.9–1.3)
EOSINOPHILS ABSOLUTE: 0 K/UL (ref 0–0.6)
EOSINOPHILS RELATIVE PERCENT: 0 %
GFR AFRICAN AMERICAN: >60
GFR NON-AFRICAN AMERICAN: >60
GLUCOSE BLD-MCNC: 96 MG/DL (ref 70–99)
HCO3 ARTERIAL: 35.1 MMOL/L (ref 21–29)
HCT VFR BLD CALC: 37.1 % (ref 40.5–52.5)
HEMOGLOBIN, ART, EXTENDED: 12.5 G/DL (ref 13.5–17.5)
HEMOGLOBIN: 12.1 G/DL (ref 13.5–17.5)
LYMPHOCYTES ABSOLUTE: 1.6 K/UL (ref 1–5.1)
LYMPHOCYTES RELATIVE PERCENT: 16.5 %
MAGNESIUM: 2.3 MG/DL (ref 1.8–2.4)
MCH RBC QN AUTO: 29.9 PG (ref 26–34)
MCHC RBC AUTO-ENTMCNC: 32.6 G/DL (ref 31–36)
MCV RBC AUTO: 91.7 FL (ref 80–100)
METHEMOGLOBIN ARTERIAL: 0.5 %
MONOCYTES ABSOLUTE: 0.7 K/UL (ref 0–1.3)
MONOCYTES RELATIVE PERCENT: 7.2 %
NEUTROPHILS ABSOLUTE: 7.3 K/UL (ref 1.7–7.7)
NEUTROPHILS RELATIVE PERCENT: 76 %
O2 SAT, ARTERIAL: 99.4 %
O2 THERAPY: ABNORMAL
PCO2 ARTERIAL: 47.7 MMHG (ref 35–45)
PDW BLD-RTO: 14.4 % (ref 12.4–15.4)
PH ARTERIAL: 7.47 (ref 7.35–7.45)
PH VENOUS: 7.43 (ref 7.35–7.45)
PHOSPHORUS: 4.8 MG/DL (ref 2.5–4.9)
PLATELET # BLD: 167 K/UL (ref 135–450)
PMV BLD AUTO: 10.7 FL (ref 5–10.5)
PO2 ARTERIAL: 193 MMHG (ref 75–108)
POTASSIUM SERPL-SCNC: 4.5 MMOL/L (ref 3.5–5.1)
RBC # BLD: 4.05 M/UL (ref 4.2–5.9)
SODIUM BLD-SCNC: 149 MMOL/L (ref 136–145)
TCO2 ARTERIAL: 81.8 MMOL/L
WBC # BLD: 9.6 K/UL (ref 4–11)

## 2022-01-28 PROCEDURE — 71045 X-RAY EXAM CHEST 1 VIEW: CPT

## 2022-01-28 PROCEDURE — 92610 EVALUATE SWALLOWING FUNCTION: CPT

## 2022-01-28 PROCEDURE — 6360000002 HC RX W HCPCS: Performed by: INTERNAL MEDICINE

## 2022-01-28 PROCEDURE — 2580000003 HC RX 258: Performed by: INTERNAL MEDICINE

## 2022-01-28 PROCEDURE — 99221 1ST HOSP IP/OBS SF/LOW 40: CPT | Performed by: SURGERY

## 2022-01-28 PROCEDURE — 94003 VENT MGMT INPAT SUBQ DAY: CPT

## 2022-01-28 PROCEDURE — 92526 ORAL FUNCTION THERAPY: CPT

## 2022-01-28 PROCEDURE — 99233 SBSQ HOSP IP/OBS HIGH 50: CPT | Performed by: PSYCHIATRY & NEUROLOGY

## 2022-01-28 PROCEDURE — 99291 CRITICAL CARE FIRST HOUR: CPT | Performed by: INTERNAL MEDICINE

## 2022-01-28 PROCEDURE — 2700000000 HC OXYGEN THERAPY PER DAY

## 2022-01-28 PROCEDURE — 2000000000 HC ICU R&B

## 2022-01-28 PROCEDURE — 83735 ASSAY OF MAGNESIUM: CPT

## 2022-01-28 PROCEDURE — 85025 COMPLETE CBC W/AUTO DIFF WBC: CPT

## 2022-01-28 PROCEDURE — 82330 ASSAY OF CALCIUM: CPT

## 2022-01-28 PROCEDURE — 94761 N-INVAS EAR/PLS OXIMETRY MLT: CPT

## 2022-01-28 PROCEDURE — 36592 COLLECT BLOOD FROM PICC: CPT

## 2022-01-28 PROCEDURE — C9113 INJ PANTOPRAZOLE SODIUM, VIA: HCPCS | Performed by: INTERNAL MEDICINE

## 2022-01-28 PROCEDURE — 82803 BLOOD GASES ANY COMBINATION: CPT

## 2022-01-28 PROCEDURE — 84100 ASSAY OF PHOSPHORUS: CPT

## 2022-01-28 PROCEDURE — 80048 BASIC METABOLIC PNL TOTAL CA: CPT

## 2022-01-28 RX ORDER — 0.9 % SODIUM CHLORIDE 0.9 %
500 INTRAVENOUS SOLUTION INTRAVENOUS ONCE
Status: COMPLETED | OUTPATIENT
Start: 2022-01-28 | End: 2022-01-28

## 2022-01-28 RX ADMIN — PROPOFOL 10 MCG/KG/MIN: 10 INJECTION, EMULSION INTRAVENOUS at 05:16

## 2022-01-28 RX ADMIN — ENOXAPARIN SODIUM 50 MG: 100 INJECTION SUBCUTANEOUS at 10:47

## 2022-01-28 RX ADMIN — Medication 10 ML: at 19:39

## 2022-01-28 RX ADMIN — SODIUM CHLORIDE 500 ML: 9 INJECTION, SOLUTION INTRAVENOUS at 05:54

## 2022-01-28 RX ADMIN — Medication 10 ML: at 10:22

## 2022-01-28 RX ADMIN — ENOXAPARIN SODIUM 50 MG: 100 INJECTION SUBCUTANEOUS at 19:39

## 2022-01-28 RX ADMIN — PANTOPRAZOLE SODIUM 40 MG: 40 INJECTION, POWDER, FOR SOLUTION INTRAVENOUS at 10:47

## 2022-01-28 RX ADMIN — DEXAMETHASONE SODIUM PHOSPHATE 20 MG: 4 INJECTION, SOLUTION INTRA-ARTICULAR; INTRALESIONAL; INTRAMUSCULAR; INTRAVENOUS; SOFT TISSUE at 10:22

## 2022-01-28 ASSESSMENT — PAIN SCALES - GENERAL
PAINLEVEL_OUTOF10: 0

## 2022-01-28 ASSESSMENT — PULMONARY FUNCTION TESTS
PIF_VALUE: 22
PIF_VALUE: 24
PIF_VALUE: 22
PIF_VALUE: 22
PIF_VALUE: 20

## 2022-01-28 NOTE — PROGRESS NOTES
Kurt Vasquez  Neurology Follow-up  [facility]    Date of Service: 1/28/2022    Subjective:   CC: Follow up today regarding: Acute encephalopathy    Events noted. Chart and lab reviewed. The patient was extubated today. He is awake and alert and can follow simple direction but waxing and waning. Generalized diffuse weakness with atrophy. Not consistent. Discussed with his nurse. ROS : A 10-12 system review could not be obtained due to poor cooperation and confusion. family history includes Diabetes in his mother.     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    Schizoaffective disorder ([address]     Strabismus     Vitamin D deficiency 10/27/2014     Current Facility-Administered Medications   Medication Dose Route Frequency Provider Last Rate Last Admin    enoxaparin (LOVENOX) injection 50 mg  1 mg/kg SubCUTAneous BID Deborah Rodriguez MD   50 mg at 01/28/22 1047    pantoprazole (PROTONIX) injection 40 mg  40 mg IntraVENous Daily Mio Bauer MD   40 mg at 01/28/22 1047    dexamethasone (DECADRON) 20 mg in sodium chloride 0.9 % IVPB  20 mg IntraVENous Daily Rob Gagnon MD   Stopped at 01/28/22 1036    propofol injection  5-50 mcg/kg/min IntraVENous Titrated Julito STAPLES MD 7.2 mL/hr at 01/28/22 0618 20 mcg/kg/min at 01/28/22 0618    norepinephrine (LEVOPHED) 16 mg in sodium chloride 0.9 % 250 mL infusion  2-100 mcg/min IntraVENous Continuous Julito STAPLES MD   Stopped at 01/27/22 1158    sodium chloride flush 0.9 % injection 5-40 mL  5-40 mL IntraVENous 2 times per day [facility] MD JHOAN   10 mL at 01/28/22 1022    sodium chloride flush 0.9 % injection 5-40 mL  5-40 mL IntraVENous PRN Julito STAPLES MD        0.9 % sodium chloride infusion  25 mL IntraVENous PRN Julito Whitaker MD   Stopped at 01/26/22 2336    ondansetron (ZOFRAN-ODT) disintegrating tablet 4 mg  4 mg Oral Q8H PRN Julito STAPLES MD        Or    ondansetron (ZOFRAN) injection 4 mg  4 mg IntraVENous Q6H PRN Julito STAPLES MD        polyethylene glycol (GLYCOLAX) packet 17 g  17 g Oral Daily PRN Julito Jackson MD        acetaminophen (TYLENOL) tablet 650 mg  650 mg Oral Q6H PRN Julito STAPLES MD        Or    acetaminophen (TYLENOL) suppository 650 mg  650 mg Rectal Q6H PRN Julito STAPLES MD        perflutren lipid microspheres (DEFINITY) injection 1.65 mg  1.5 mL IntraVENous ONCE PRN Julito STAPLES MD        midazolam (VERSED) 1 mg/mL in D5W infusion  1-10 mg/hr IntraVENous Continuous Js Xie MD   Stopped at 01/27/22 0936    fentaNYL 10 mcg/mL infusion  12.5-200 mcg/hr IntraVENous Continuous Js Xie MD 2.5 mL/hr at 01/28/22 0618 25 mcg/hr at 01/28/22 0618     No Known Allergies   reports that he has never smoked. He has never used smokeless tobacco. He reports previous drug use. He reports that he does not drink alcohol. Objective: Continue with the help of his nurse. Constitutional:   Vitals:    01/28/22 0600 01/28/22 0825 01/28/22 0845 01/28/22 0900   BP: (!) 107/92   107/82   Pulse: 90 86 91 93   Resp: 16 16 16 16   Temp:       TempSrc:       SpO2: 100% 100% 100% 100%   Weight:       Height:           General appearance: Confused   Mental Status: The patient is awake and alert  Waxing and waning  Nonsensical speech  Unable to test memory or fund of knowledge due to poor cooperation  Cranial Nerves:   II: Pupils: equal, round, reactive to light  III,IV,VI: No gaze preference. No nystagmus  V: Facial sensation: Grossly unremarkable. VII: Facial strength and movements: intact and symmetric  XII: Tongue movements : midline  Musculoskeletal: Generalized diffuse weakness with poor effort, chronic with disuse atrophy. Normal tone.   No sensory disturbance no tremors or dysmetria  Gait cannot be tested    Data:  LABS:   Lab Results   Component Value Date     01/28/2022    K 4.5 01/28/2022    K 4.0 08/15/2019     01/28/2022    CO2 35 01/28/2022    BUN 29 01/28/2022    CREATININE 0.7 01/28/2022    GFRAA >60 01/28/2022    GFRAA >60 09/27/2010    LABGLOM >60 01/28/2022    GLUCOSE 96 01/28/2022    PHOS 4.8 01/28/2022    MG 2.30 01/28/2022    CALCIUM 9.2 01/28/2022     Lab Results   Component Value Date    WBC 9.6 01/28/2022    RBC 4.05 01/28/2022    HGB 12.1 01/28/2022    HCT 37.1 01/28/2022    MCV 91.7 01/28/2022    RDW 14.4 01/28/2022     01/28/2022     Lab Results   Component Value Date    INR 1.19 (H) 12/21/2017    PROTIME 13.5 (H) 12/21/2017       labs were independently reviewed by me  Reviewed notes from different physicians. Impression:    Acute encephalopathy, severe. Could be acute encephalopathy secondary to respiratory failure and hypoxia from bilateral PE. Acute respiratory failure with hypoxia  Bilateral PE  Rule out sepsis  Hypernatremia      Recommendation  Continue current supportive care  Respiratory support  Anticoagulation  Consider MRI brain  Telemetry  PPI  Insulin sliding scale  Avoid sedation  Follow electrolytes  ? His baseline  PT, OT and speech  Discussed with his nurse  We will follow           Coreen Reddy MD   [phone]      This dictation was generated by voice recognition computer software. Although all attempts are made to edit the dictation for accuracy, there may be errors in the transcription that are not intended.

## 2022-01-28 NOTE — PROGRESS NOTES
Sedation decreased this AM to prepare for extubation after pt fully awake. Placed on PS during rounds, but pt having apnea episodes at that time. Continued to leave sedation off and give time to wake. Pt self extubated at this time. Vital signs are stable, SpO2 100% on RA. Pt alert, able to state \"I feel fine\",but not answering orientation questions with anything appropriate. Unsure what pt's baseline mental status is at this time. Will monitor closely.

## 2022-01-28 NOTE — PLAN OF CARE
Problem: Skin Integrity:  Goal: Will show no infection signs and symptoms  Description: Will show no infection signs and symptoms  Outcome: Ongoing  Goal: Absence of new skin breakdown  Description: Absence of new skin breakdown  Outcome: Ongoing     Problem: Falls - Risk of:  Goal: Will remain free from falls  Description: Will remain free from falls  Outcome: Ongoing  Goal: Absence of physical injury  Description: Absence of physical injury  Outcome: Ongoing     Problem: Nutrition  Goal: Optimal nutrition therapy  1/27/2022 2147 by Wayne Arrieta RN  Outcome: Ongoing  1/27/2022 1257 by Denise Zarate RD, LD  Outcome: Ongoing     Problem: Airway Clearance - Ineffective  Goal: Achieve or maintain patent airway  Outcome: Ongoing     Problem: Gas Exchange - Impaired  Goal: Absence of hypoxia  Outcome: Ongoing  Goal: Promote optimal lung function  Outcome: Ongoing     Problem: Breathing Pattern - Ineffective  Goal: Ability to achieve and maintain a regular respiratory rate  Outcome: Ongoing     Problem:  Body Temperature -  Risk of, Imbalanced  Goal: Ability to maintain a body temperature within defined limits  Outcome: Ongoing  Goal: Will regain or maintain usual level of consciousness  Outcome: Ongoing  Goal: Complications related to the disease process, condition or treatment will be avoided or minimized  Outcome: Ongoing     Problem: Isolation Precautions - Risk of Spread of Infection  Goal: Prevent transmission of infection  Outcome: Ongoing     Problem: Nutrition Deficits  Goal: Optimize nutritional status  Outcome: Ongoing     Problem: Risk for Fluid Volume Deficit  Goal: Maintain normal heart rhythm  Outcome: Ongoing  Goal: Maintain absence of muscle cramping  Outcome: Ongoing  Goal: Maintain normal serum potassium, sodium, calcium, phosphorus, and pH  Outcome: Ongoing     Problem: Loneliness or Risk for Loneliness  Goal: Demonstrate positive use of time alone when socialization is not possible  Outcome: Ongoing     Problem: Fatigue  Goal: Verbalize increase energy and improved vitality  Outcome: Ongoing     Problem: Patient Education: Go to Patient Education Activity  Goal: Patient/Family Education  Outcome: Ongoing

## 2022-01-28 NOTE — PROGRESS NOTES
01/28/22 0825   Vent Patient Data   Peak Inspiratory Pressure 20 cmH2O   Mean Airway Pressure 10 cmH20   Rate Measured 16 br/min   Vt Exhaled 512 mL   Minute Volume 8.32 Liters   I:E Ratio 1:2.4   Plateau Pressure 16 [id number]   Static Compliance 46.54 mL/cmH2O   Dynamic Compliance 34.13 mL/cmH2O   Total PEEP 5.8 cmH20   Auto PEEP 0.8 cmH20

## 2022-01-28 NOTE — PROGRESS NOTES
Attempted to call [phone] and discuss pt baseline mental status. Unable to reach anyone who can answer questions.  After looking through notes, only able to find that pt is \"alert and responsive\" at baseline

## 2022-01-28 NOTE — PROGRESS NOTES
VASCULAR    Full consult from yesterday. Agree with Pattie Middleton CNP report. Independent evaluation performed - no disagreement. IMP: Acute PE - limited effect without RV strain on ECHO and good oxygenation. No hemodynamic instability. COVID pneumonia    REC: Anticoagulation only. No plans for thrombolytics or other PE intervention. Will see as needed. Please call for any further questions or problems.      Damien Metzger

## 2022-01-28 NOTE — PROGRESS NOTES
Hospitalist Progress Note      PCP: No primary care provider on file. Date of Admission: 1/26/2022    Chief Complaint: unresponsive    Hospital Course: 49 y.o. male with past medical history of hypertension, HSV, schizoaffective disorder presents to the ED from nursing home in view of unresponsiveness. Last known well was yesterday. Found unresponsive this morning. Arrival to ED ED, multiple measures to wake up the patient failed. Per ED staff unable to protect airway. Was emergently intubated. CT head without acute findings. CTPA shows bilateral pulmonary emboli, started on heparin drip. Respiratory acidosis on blood gas. Hypertensive, central venous catheter inserted at the ED.       Subjective:   He was vented at the time of my visit but he self extubated later in the day. Medications:  Reviewed    Infusion Medications    propofol Stopped (01/28/22 0913)    norepinephrine Stopped (01/27/22 1158)    sodium chloride Stopped (01/26/22 2336)    midazolam Stopped (01/27/22 0936)    fentaNYL Stopped (01/28/22 0913)     Scheduled Medications    enoxaparin  1 mg/kg SubCUTAneous BID    pantoprazole  40 mg IntraVENous Daily    dexamethasone (DECADRON) IVPB  20 mg IntraVENous Daily    sodium chloride flush  5-40 mL IntraVENous 2 times per day     PRN Meds: sodium chloride flush, sodium chloride, ondansetron **OR** ondansetron, polyethylene glycol, acetaminophen **OR** acetaminophen, perflutren lipid microspheres      Intake/Output Summary (Last 24 hours) at 1/28/2022 1722  Last data filed at 1/28/2022 1504  Gross per 24 hour   Intake 1030.2 ml   Output 900 ml   Net 130.2 ml       Physical Exam Performed:    /82   Pulse 107   Temp 98.1 °F (36.7 °C) (Core)   Resp 20   Ht 6' (1.829 m)   Wt 103 lb 1.6 oz (46.8 kg)   SpO2 100%   BMI 13.98 kg/m²     General appearance:  appears comfortable, he is thin,   HEENT: Pupils equal, round, and reactive to light.  Conjunctivae/corneas clear.  Neck: Supple, with full range of motion. No jugular venous distention. Trachea midline. Respiratory:  Has some rhonchi,   Cardiovascular: tachycardic rate and rhythm with normal S1/S2 without murmurs, rubs or gallops. Abdomen: Soft, non-tender, non-distended with normal bowel sounds. Musculoskeletal: No clubbing, cyanosis or edema bilaterally. Skin: Skin color, texture, turgor normal.  No rashes or lesions. Neurologic:  Neurovascularly intact without any focal sensory/motor deficits. Cranial nerves: II-XII intact, grossly non-focal.  Psychiatric: sedated  Capillary Refill: Brisk,3 seconds, normal   Peripheral Pulses: +2 palpable, equal bilaterally       Labs:   Recent Labs     01/26/22  2227 01/26/22  2227 01/27/22  0430 01/27/22  1010 01/28/22  0445   WBC 8.1  --  9.2  --  9.6   HGB 13.6   < > 13.6 14.8 12.1*   HCT 42.6  --  42.0  --  37.1*     --  217  --  167    < > = values in this interval not displayed. Recent Labs     01/26/22  1315 01/27/22  0430 01/28/22  0445    146* 149*   K 5.0 4.3 4.5   CL 99 103 106   CO2 38* 29 35*   BUN 24* 22* 29*   CREATININE 0.6* 0.7* 0.7*   CALCIUM 9.1 8.6 9.2   PHOS  --  2.5 4.8     Recent Labs     01/26/22  1315   AST 32   ALT 40   BILITOT 0.5   ALKPHOS 67     No results for input(s): INR in the last 72 hours. Recent Labs     01/26/22  1315 01/26/22  2225 01/27/22  0130   TROPONINI <0.01 0.02* 0.01       Urinalysis:      Lab Results   Component Value Date    NITRU Negative 01/26/2022    WBCUA 6 01/26/2022    RBCUA 3 01/26/2022    BLOODU Negative 01/26/2022    SPECGRAV 1.028 01/26/2022    GLUCOSEU Negative 01/26/2022    GLUCOSEU NEGATIVE 09/27/2010       Radiology:  XR CHEST PORTABLE   Final Result   1. Nasogastric tube terminating in the gastric body.          VL Extremity Venous Bilateral   Final Result      CT CHEST PULMONARY EMBOLISM W CONTRAST   Final Result   Moderate acute pulmonary embolus to the pulmonary arteries to the left lower   lobe and a smaller pulmonary emboli scattered in the pulmonary arteries to   the right lower lobe. Unremarkable thoracic aorta with no mediastinal mass or adenopathy and no   right heart significant right heart enlargement      ET tube and gastric tube in good position      Mild hyperinflation with no obvious acute pulmonary abnormality. The findings were called to [facility]  at 5:40 p.m.         CT HEAD WO CONTRAST   Final Result   No hemorrhage or mass identified. There is cerebellar atrophy      Mild paranasal sinus disease      RECOMMENDATIONS:   Unavailable         XR ABDOMEN FOR NG/OG/NE TUBE PLACEMENT   Final Result   Tip of nasogastric tube is at the proximal stomach. Advancement of 8 cm is   suggested, in order for the distal side port of the tube to be intragastric. Mild to moderate gastric distention. XR CHEST PORTABLE   Final Result   ET tube terminates above the manasa. XR CHEST PORTABLE    (Results Pending)           Assessment/Plan:    Active Hospital Problems    Diagnosis     Altered mental status [R41.82]     Acute respiratory failure with hypoxia ([facility] [address] [J96.01]     Acute pulmonary embolism without acute cor pulmonale (HCC) [I26.99]      Bilateral pulmonary emboli  On heparin GTT, switched to therapeutic lovenox  No need for catheter directed thrombolysis  Off the vent. COVID-19 pnuemonia  - he is on decadron. Was intubated in the ED  Echo Mildly reduced global systolic function with an ejection fraction estimated   at 40-45%. -Mild Global hypokinesis noted  Tried on spontaneous mode today but was apneic and placed back on full support. Self extubated, doing fine off vent      Acute hypercapnic respiratory failure  - off vent      Unresponsiveness  CT head nonacute  We will need MRI  Neurology consulted/following     Shock  Cardiogenic?   Distributive  No signs of infection  CVC placed in the ED and started on Levophed  Empiric vancomycin and cefepime    DVT Prophylaxis: lovneox  Diet: Diet NPO  Code Status: Full Code    PT/OT Eval Status: eval and treat when appropriate    Dispo - Total critical care time was 33 minutes, excluding separately reportable procedures. Services included in critical care time were chart data review, documentation time, obtaining information from patient, review of nursing notes, and vital sign assessment. There was a high probability of clinically significant life-threatening deterioration in the patient's condition, which required my urgent intervention.          Cynthia Christina MD

## 2022-01-28 NOTE — PROGRESS NOTES
PULMONARY AND CRITICAL CARE MEDICINE PROGRESS NOTE    Subjective: No acute events overnight. Patient is on minimal PEEP and FiO2. Minimal sedation. He is able to follow simple commands. ROS could not be obtained due to patient factors. MEDICATIONS:     Scheduled Meds:   enoxaparin  1 mg/kg SubCUTAneous BID    pantoprazole  40 mg IntraVENous Daily    dexamethasone (DECADRON) IVPB  20 mg IntraVENous Daily    sodium chloride flush  5-40 mL IntraVENous 2 times per day       Current Infusions:    propofol 20 mcg/kg/min (01/28/22 0618)    norepinephrine Stopped (01/27/22 1158)    sodium chloride Stopped (01/26/22 2336)    midazolam Stopped (01/27/22 0936)    fentaNYL 25 mcg/hr (01/28/22 0618)       PRN meds:  sodium chloride flush, sodium chloride, ondansetron **OR** ondansetron, polyethylene glycol, acetaminophen **OR** acetaminophen, perflutren lipid microspheres    PHYSICAL EXAM:  /82   Pulse 93   Temp 97.2 °F (36.2 °C) (Core)   Resp 16   Ht 6' (1.829 m)   Wt 103 lb 1.6 oz (46.8 kg)   SpO2 100%   BMI 13.98 kg/m²  I/O last 3 completed shifts: In: 2306.3 [I.V.:1851.3; IV Piggyback:455]  Out: 1600 [Urine:1550; Emesis/NG output:50] No intake/output data recorded. Intake/Output Summary (Last 24 hours) at 1/28/2022 1432  Last data filed at 1/28/2022 4598  Gross per 24 hour   Intake 388.19 ml   Output 600 ml   Net -211.81 ml       CONSTITUTIONAL: He is a 49y.o.-year-old who appears his stated age. He is  in no acute distress. Cachexia  CARDIOVASCULAR: S1 S2 RRR. Without murmer  RESPIRATORY & CHEST: Lungs are clear to auscultation and percussion. No wheezing, no crackles. Good air movement  GASTROINTESTINAL & ABDOMEN: Soft, nontender, positive bowel sounds in all quadrants, non-distended, without hepatosplenomegaly. GENITOURINARY: Deferred. MUSCULOSKELETAL: No tenderness to palpation of the axial skeleton. There is no clubbing. No cyanosis. No edema of the lower extremities. SKIN OF BODY: No rash or jaundice. PSYCHIATRIC EVALUATION: Could not be assessed  HEMATOLOGIC/LYMPHATIC/ IMMUNOLOGIC: No palpable lymphadenopathy. NEUROLOGIC: Sedated. Groslly non-focal.    LABS:    Recent Labs     01/26/22 1315 01/26/22  1315 01/26/22 2227 01/26/22 2227 01/27/22 0430 01/27/22  1010 01/28/22  0445   WBC 8.7   < > 8.1  --  9.2  --  9.6   RBC 5.47   < > 4.62  --  4.59  --  4.05*   HGB 16.2   < > 13.6   < > 13.6 14.8 12.1*   HCT 51.3   < > 42.6  --  42.0  --  37.1*   MCH 29.6   < > 29.4  --  29.7  --  29.9   MCHC 31.6   < > 31.9  --  32.5  --  32.6   RDW 14.5   < > 14.1  --  14.2  --  14.4      < > 211  --  217  --  167   MPV 9.5   < > 10.1  --  9.9  --  10.7*   NEUTOPHILPCT 81.0  --   --   --  73.5  --  76.0   MONOPCT 8.0  --   --   --  7.7  --  7.2   BASOPCT 0.4  --   --   --  0.3  --  0.3    < > = values in this interval not displayed. Recent Labs     01/26/22 1315 01/27/22 0430 01/28/22  0445    146* 149*   K 5.0 4.3 4.5   CL 99 103 106   ANIONGAP 7 14 8   CO2 38* 29 35*   BUN 24* 22* 29*   CREATININE 0.6* 0.7* 0.7*   CALCIUM 9.1 8.6 9.2   PROT 7.6  --   --    BILITOT 0.5  --   --    ALKPHOS 67  --   --    ALT 40  --   --    AST 32  --   --    GLUCOSE 166* 133* 96     Recent Labs     01/27/22  1010 01/28/22  0445   PHART 7.446 7.475*   DRV0KTN 50.6* 47.7*   PO2ART 124.7* 193.0*   CVD2NKD 34.9* 35.1*   E1UEOOXV 99 99.4   BEART 11* 10.0*          IMAGING:  I reviewed the CT chest and my interpretation is as follows. Pulmonary embolism involving pulmonary artery of the left lower lobe and right lower lobe.       IMPRESSION:  Acute bilateral PE  COVID-19 rule out  Acute hypercapnic and hypoxic respiratory failure  Severe protein calorie malnutrition  Schizoaffective disorder        PLAN:  Patient is sedated with propofol and fentanyl. Total sedation of for vent wean.     Presented with acute hypoxic and hypercapnic respiratory failure.   Noted to have bilateral PE likely precipitating cause for respiratory failure. Patient does not have any history of COPD documented. I reviewed the images myself and I do not think that patient is a candidate for EKOS. Patient had minimal troponinemia with normal proBNP. No RV strain noted. No intervention per vascular surgery. .  Continue therapeutic Lovenox. Noted to have bleeding femoral central line which is improved now. Discontinue femoral central line. Patient was given a trial of pressure support ventilation which she did not tolerate because of apnea. Later on he self extubated and is maintaining saturations on nasal cannula.     Severe protein calorie malnutrition. Patient lives in [facility]. He looks very cachectic and malnourished.     COVID positive. As per son, patient was in Covid isolation of at [facility]. CRP minimally elevated at 14. Decreased Decadron 10 mg IV daily with slow taper over 7 days.     No evidence of superinfection. CT does not show any pneumonia. Urinalysis is negative. Procalcitonin normal.  Antibiotics can be discontinued.     Protonix for stress ulcer prophylaxis.     Patient would need anticoagulation which can be switched to p.o. with NOACs for at least 4 months as this is likely a provoked PE in the setting of Covid. Critical Care Time: 35 Minutes  Total critical care time caring for this patient with life threatening illness, including direct patient contact, management of life support systems, review of data including imaging and labs, discussions with other team members and physicians excluding procedures. South Iyer MD  Pulmonary Critical Care and Sleep Medicine  1/28/2022, 2:32 PM    This note was completed using dragon medical speech recognition software. Grammatical errors, random word insertions, pronoun errors and incomplete sentences are occasional consequences of this technology due to software limitations.  If there are questions or concerns about the content of this note of information contained within the body of this dictation they should be addressed with the provider for clarification.

## 2022-01-28 NOTE — PROGRESS NOTES
Physician Progress Note      Thelma Shipley  CSN #:                  [id number]  :                       1965  ADMIT DATE:       2022 1:16 PM  [address] DATE:  RESPONDING  PROVIDER #:        Karen Trammell MD        QUERY TEXT:    Type of Shock: Please provide further specificity, if known. Clinical indicators include: blood, hgb, hct, hemorrhage, shock, ardiogenic  Options provided:  -- Cardiogenic shock  -- Septic shock  -- Hypovolemic shock  -- Hemorrhagic shock due to trauma  -- Hemorrhagic shock related to surgery  -- Anaphylactic shock  -- Other - I will add my own diagnosis  -- Disagree - Not applicable / Not valid  -- Disagree - Clinically Unable to determine / Unknown        PROVIDER RESPONSE TEXT:    Provider was unable to determine a response for this query.       Electronically signed by:  Karen Trammell MD 2022 1:21 PM

## 2022-01-28 NOTE — PROGRESS NOTES
Hospitalist Progress Note      PCP: No primary care provider on file. Date of Admission: 1/26/2022    Chief Complaint: unresponsive    Hospital Course: 49 y.o. male with past medical history of hypertension, HSV, schizoaffective disorder presents to the ED from nursing home in view of unresponsiveness. Last known well was yesterday. Found unresponsive this morning. Arrival to ED ED, multiple measures to wake up the patient failed. Per ED staff unable to protect airway. Was emergently intubated. CT head without acute findings. CTPA shows bilateral pulmonary emboli, started on heparin drip. Respiratory acidosis on blood gas. Hypertensive, central venous catheter inserted at the ED.       Subjective:   vented      Medications:  Reviewed    Infusion Medications    propofol 10 mcg/kg/min (01/27/22 1720)    norepinephrine Stopped (01/27/22 1158)    sodium chloride Stopped (01/26/22 2336)    midazolam Stopped (01/27/22 0936)    fentaNYL 25 mcg/hr (01/27/22 1153)     Scheduled Medications    enoxaparin  1 mg/kg SubCUTAneous BID    pantoprazole  40 mg IntraVENous Daily    dexamethasone (DECADRON) IVPB  20 mg IntraVENous Daily    sodium chloride flush  5-40 mL IntraVENous 2 times per day     PRN Meds: sodium chloride flush, sodium chloride, ondansetron **OR** ondansetron, polyethylene glycol, acetaminophen **OR** acetaminophen, perflutren lipid microspheres      Intake/Output Summary (Last 24 hours) at 1/27/2022 1954  Last data filed at 1/27/2022 1755  Gross per 24 hour   Intake 1918.11 ml   Output 1350 ml   Net 568.11 ml       Physical Exam Performed:    /89   Pulse 109   Temp 100.6 °F (38.1 °C) (Core)   Resp 16   Ht 6' (1.829 m)   Wt 103 lb 3.2 oz (46.8 kg)   SpO2 97%   BMI 14.00 kg/m²     General appearance: vented, appears comfortable, he is thin  HEENT: Pupils equal, round, and reactive to light. Conjunctivae/corneas clear. Neck: Supple, with full range of motion.  No jugular venous distention. Trachea midline. Respiratory:  Has some rhonchi  Cardiovascular: tachycardic rate and rhythm with normal S1/S2 without murmurs, rubs or gallops. Abdomen: Soft, non-tender, non-distended with normal bowel sounds. Musculoskeletal: No clubbing, cyanosis or edema bilaterally. Skin: Skin color, texture, turgor normal.  No rashes or lesions. Neurologic:  Neurovascularly intact without any focal sensory/motor deficits. Cranial nerves: II-XII intact, grossly non-focal.  Psychiatric: sedated  Capillary Refill: Brisk,3 seconds, normal   Peripheral Pulses: +2 palpable, equal bilaterally       Labs:   Recent Labs     01/26/22  1315 01/26/22  1315 01/26/22  2227 01/27/22  0430 01/27/22  1010   WBC 8.7  --  8.1 9.2  --    HGB 16.2   < > 13.6 13.6 14.8   HCT 51.3  --  42.6 42.0  --      --  211 217  --     < > = values in this interval not displayed. Recent Labs     01/26/22  1315 01/27/22  0430    146*   K 5.0 4.3   CL 99 103   CO2 38* 29   BUN 24* 22*   CREATININE 0.6* 0.7*   CALCIUM 9.1 8.6   PHOS  --  2.5     Recent Labs     01/26/22  1315   AST 32   ALT 40   BILITOT 0.5   ALKPHOS 67     No results for input(s): INR in the last 72 hours. Recent Labs     01/26/22  1315 01/26/22  2225 01/27/22  0130   TROPONINI <0.01 0.02* 0.01       Urinalysis:      Lab Results   Component Value Date    NITRU Negative 01/26/2022    WBCUA 6 01/26/2022    RBCUA 3 01/26/2022    BLOODU Negative 01/26/2022    SPECGRAV 1.028 01/26/2022    GLUCOSEU Negative 01/26/2022    GLUCOSEU NEGATIVE 09/27/2010       Radiology:  VL Extremity Venous Bilateral   Final Result      CT CHEST PULMONARY EMBOLISM W CONTRAST   Final Result   Moderate acute pulmonary embolus to the pulmonary arteries to the left lower   lobe and a smaller pulmonary emboli scattered in the pulmonary arteries to   the right lower lobe.       Unremarkable thoracic aorta with no mediastinal mass or adenopathy and no   right heart significant right heart enlargement      ET tube and gastric tube in good position      Mild hyperinflation with no obvious acute pulmonary abnormality. The findings were called to [facility] [place]  at 5:40 p.m.         CT HEAD WO CONTRAST   Final Result   No hemorrhage or mass identified. There is cerebellar atrophy      Mild paranasal sinus disease      RECOMMENDATIONS:   Unavailable         XR ABDOMEN FOR NG/OG/NE TUBE PLACEMENT   Final Result   Tip of nasogastric tube is at the proximal stomach. Advancement of 8 cm is   suggested, in order for the distal side port of the tube to be intragastric. Mild to moderate gastric distention. XR CHEST PORTABLE   Final Result   ET tube terminates above the manasa. XR CHEST PORTABLE    (Results Pending)           Assessment/Plan:    Active Hospital Problems    Diagnosis     Altered mental status [R41.82]     Acute respiratory failure with hypoxia ([address] [J96.01]     Acute pulmonary embolism without acute cor pulmonale (HCC) [I26.99]      Bilateral pulmonary emboli  On heparin GTT, switched to therapeutic lovenox  No need for catheter directed thrombolysis    Was intubated in the ED  Echo Mildly reduced global systolic function with an ejection fraction estimated   at 40-45%. -Mild Global hypokinesis noted     VDRF  Emergently intubated in the ED for airway protection in view of unresponsiveness  Intensivist consulted for management management     Acute hypercapnic respiratory failure  Intubated and ventilated  Intensivist is consulted     Unresponsiveness  CT head nonacute  We will need MRI  Neurology consulted/following     Shock  Cardiogenic?   Distributive  No signs of infection  CVC placed in the ED and started on Levophed  Empiric vancomycin and cefepime    DVT Prophylaxis: lovneox  Diet: Diet NPO  Code Status: Full Code    PT/OT Eval Status: eval and treat when appropriate    Dispo - Total critical care time was 33 minutes, excluding separately reportable procedures. Services included in critical care time were chart data review, documentation time, obtaining information from patient, review of nursing notes, and vital sign assessment. There was a high probability of clinically significant life-threatening deterioration in the patient's condition, which required my urgent intervention.          Bryanna Patten MD

## 2022-01-28 NOTE — PROGRESS NOTES
Facility/Department: [address]  Initial Assessment  DYSPHAGIA BEDSIDE SWALLOW EVALUATION     Patient: Odilia Mas   : 1965   MRN: [id number]      Evaluation Date: 2022   Admitting Diagnosis: Hypercapnia [R06.89]  Pulmonary embolism, bilateral (HCC) [I26.99]  CO2 narcosis [R06.89]  Altered mental status, unspecified altered mental status type [R41.82]  Acute pulmonary embolism without acute cor pulmonale, unspecified pulmonary embolism type (HCC) [I26.99]  Multiple subsegmental pulmonary emboli without acute cor pulmonale (HCC) [I26.94]  Pain: Pt did not report pain                         H&P: 49 y.o. male with past medical history of hypertension, HSV, schizoaffective disorder presents to the ED from nursing home in view of unresponsiveness. Last known well was yesterday. Found unresponsive this morning. Arrival to ED ED, multiple measures to wake up the patient failed. Per ED staff unable to protect airway. Was emergently intubated. CT head without acute findings. CTPA shows bilateral pulmonary emboli, started on heparin drip. Respiratory acidosis on blood gas. Hypertensive, central venous catheter inserted at the ED. Chest xray:   FINDINGS:   The nasogastric tube terminates in the gastric body.  No change in the   endotracheal tube.  The lungs are clear.  The cardiac silhouette is within   normal limits.  There is no pneumothorax or pleural effusion. Head CT:   No hemorrhage or mass identified. Pauleen Alon is cerebellar atrophy       Mild paranasal sinus disease     Modified Barium Swallow Study: None per chart     History/Prior Level of Function:   Living Status: Pt in from [facility]  Prior Dysphagia History: None reported, however Pt's baseline status is not known. Per chart from [facility] Pt was on a regular texture diet with thin liquids. Pt currently NPO as he was extubated earlier this date.     Dysphagia Impressions/Diagnosis: Moderate-Severe Oropharyngeal Dysphagia   Pt was seen sitting upright in bed with persistent leaning to the left. Pt unable to provide a history, however was verbally responsive. Slow speech pattern noted with dysphonic vocal quality. Reduced speech intelligibility assessed. Pt did not consistently follow commands to assess oral motor function. Pt with open mouth posture with his head tilted back, Pt able to close lips when verbal cues were provided. Trials of thin liquids, mildly thick liquids, and purees were provided to assess swallow function. Pt presents with moderate-severe oropharyngeal dysphagia characterized by reduced labial closure to strip bolus from spoon, reduced bolus control, suspected premature bolus loss to pharynx, delayed swallow initiation, use of multiple swallows, reduced laryngeal elevation upon manual palpation, and intermittent wet vocal quality post swallow. With all PO trials, use of multiple swallows were noted, suspect reduced/delayed pharyngeal clearing. No overt coughing was assessed however question reduced sensation. Due to concern for reduced pharyngeal clearing, decreased airway protection, and concern for possible aspiration recommend NPO with ongoing swallowing assessment at this time. Recommended Diet and Intervention 1/28/2022:  Diet Solids Recommendation:  NPO Liquid Consistency Recommendation:  NPO Recommended form of Meds:   NPO     Compensatory Swallowing Strategies: TBD    SHORT TERM DYSPHAGIA GOALS/PLAN OF CARE: Speech therapy for dysphagia tx 3-5 times per week during acute care stay.     Pt will functionally tolerate ongoing assessment of swallow function with diet to be determined as indicated     Dysphagia Therapeutic Intervention:  Oral Care, Therapeutic Trials with SLP     Discharge Recommendations: Recommend ongoing SLP for dysphagia therapy upon discharge from hospital     Patient Positioning: Upright in bed    Current Diet Level (prior to evaluation): NPO      Respiratory Status:   [x]Room Air   []O2 via nasal cannula   []Other:    Dentition:  []Adequate  []Dentures   [x]Missing Many Teeth  []Edentulous  []Other:    Baseline Vocal Quality:  []Normal  [x]Dysphonic   []Aphonic   [x]Hoarse  [x]Wet  []Weak  []Other:    Volitional Swallow:   []Absent   [x]Delayed     []Adequate     []Required use of drink     Oral Mechanism Exam:  []WFL []Mild   [] Moderate  []Severe  [x]To be assessed  Impaired:   []Left side      []Right side    []Labial ROM/Coordination    []Labial Symmetry   []Lingual ROM/Coordination   []Lingual Symmetry  []Gag  []Other:     Oral Phase: []WFL []Mild   [x] Moderate  [x]Severe  []To be assessed   [x]Impaired/Prolonged Mastication:   []Spillage Left:   []Spillage Right:  []Pocketing Left:   []Pocketing Right:   [x]Decreased Anterior to Posterior Transit:   [x]Suspected Premature Bolus Loss:   []Lingual/Palatal Residue:   [x]Other: reduced labial seal    Pharyngeal Phase: []WFL []Mild   [x] Moderate  [x]Severe  []To be assessed   [x]Delayed Swallow:   [x]Suspected Pharyngeal Pooling:   [x]Decreased Laryngeal Elevation:   []Absent Swallow:  [x]Wet Vocal Quality:   []Throat Clearing-Immediate:   []Throat Clearing-Delayed:   []Cough-Immediate:   []Cough-Delayed:  []Change in Vital Signs:  [x]Suspected Delayed Pharyngeal Clearing:  []Other:       Eating Assistance:  []Independent  []Setup or clean-up assistance   [] Supervision or touching assistance   [] Partial or moderate assistance   [] Substantial or maximal assistance  [x] Dependent       EDUCATION:   Provided education regarding role of SLP, results of assessment, recommendations and general speech pathology plan of care. [] Pt verbalized understanding and agreement   [] Pt requires ongoing learning   [x] No evidence of comprehension     If patient discharges prior to next visit, this note will serve as discharge.      Timed Code Minutes: 0 minutes  Total Treatment Minutes: 25 minutes    Electronically signed by:   Sonia Caruso M.A., [address] [place] [address] [id number]  Speech-Language Pathologist

## 2022-01-29 ENCOUNTER — APPOINTMENT (OUTPATIENT)
Dept: GENERAL RADIOLOGY | Age: 57
DRG: 208 | End: 2022-01-29
Payer: COMMERCIAL

## 2022-01-29 LAB
ANION GAP SERPL CALCULATED.3IONS-SCNC: 7 MMOL/L (ref 3–16)
BASOPHILS ABSOLUTE: 0 K/UL (ref 0–0.2)
BASOPHILS RELATIVE PERCENT: 0.5 %
BUN BLDV-MCNC: 30 MG/DL (ref 7–20)
CALCIUM IONIZED: 1.05 MMOL/L (ref 1.12–1.32)
CALCIUM SERPL-MCNC: 9 MG/DL (ref 8.3–10.6)
CHLORIDE BLD-SCNC: 110 MMOL/L (ref 99–110)
CO2: 31 MMOL/L (ref 21–32)
CREAT SERPL-MCNC: 0.5 MG/DL (ref 0.9–1.3)
EOSINOPHILS ABSOLUTE: 0 K/UL (ref 0–0.6)
EOSINOPHILS RELATIVE PERCENT: 0 %
GFR AFRICAN AMERICAN: >60
GFR NON-AFRICAN AMERICAN: >60
GLUCOSE BLD-MCNC: 110 MG/DL (ref 70–99)
GLUCOSE BLD-MCNC: 79 MG/DL (ref 70–99)
HCT VFR BLD CALC: 36 % (ref 40.5–52.5)
HEMOGLOBIN: 11.6 G/DL (ref 13.5–17.5)
LYMPHOCYTES ABSOLUTE: 1.1 K/UL (ref 1–5.1)
LYMPHOCYTES RELATIVE PERCENT: 14 %
MAGNESIUM: 2.3 MG/DL (ref 1.8–2.4)
MCH RBC QN AUTO: 29.8 PG (ref 26–34)
MCHC RBC AUTO-ENTMCNC: 32.3 G/DL (ref 31–36)
MCV RBC AUTO: 92.1 FL (ref 80–100)
MONOCYTES ABSOLUTE: 0.7 K/UL (ref 0–1.3)
MONOCYTES RELATIVE PERCENT: 9.2 %
NEUTROPHILS ABSOLUTE: 5.9 K/UL (ref 1.7–7.7)
NEUTROPHILS RELATIVE PERCENT: 76.3 %
PDW BLD-RTO: 14.7 % (ref 12.4–15.4)
PERFORMED ON: ABNORMAL
PH VENOUS: 7.47 (ref 7.35–7.45)
PHOSPHORUS: 3.4 MG/DL (ref 2.5–4.9)
PLATELET # BLD: 148 K/UL (ref 135–450)
PMV BLD AUTO: 10.6 FL (ref 5–10.5)
POTASSIUM SERPL-SCNC: 4.4 MMOL/L (ref 3.5–5.1)
RBC # BLD: 3.91 M/UL (ref 4.2–5.9)
SODIUM BLD-SCNC: 148 MMOL/L (ref 136–145)
WBC # BLD: 7.7 K/UL (ref 4–11)

## 2022-01-29 PROCEDURE — 6360000002 HC RX W HCPCS: Performed by: INTERNAL MEDICINE

## 2022-01-29 PROCEDURE — 80048 BASIC METABOLIC PNL TOTAL CA: CPT

## 2022-01-29 PROCEDURE — 92526 ORAL FUNCTION THERAPY: CPT

## 2022-01-29 PROCEDURE — 83735 ASSAY OF MAGNESIUM: CPT

## 2022-01-29 PROCEDURE — 1200000000 HC SEMI PRIVATE

## 2022-01-29 PROCEDURE — 2580000003 HC RX 258: Performed by: INTERNAL MEDICINE

## 2022-01-29 PROCEDURE — 85025 COMPLETE CBC W/AUTO DIFF WBC: CPT

## 2022-01-29 PROCEDURE — 51701 INSERT BLADDER CATHETER: CPT

## 2022-01-29 PROCEDURE — 71045 X-RAY EXAM CHEST 1 VIEW: CPT

## 2022-01-29 PROCEDURE — 84100 ASSAY OF PHOSPHORUS: CPT

## 2022-01-29 PROCEDURE — C9113 INJ PANTOPRAZOLE SODIUM, VIA: HCPCS | Performed by: INTERNAL MEDICINE

## 2022-01-29 PROCEDURE — 51702 INSERT TEMP BLADDER CATH: CPT

## 2022-01-29 PROCEDURE — 36415 COLL VENOUS BLD VENIPUNCTURE: CPT

## 2022-01-29 PROCEDURE — 82330 ASSAY OF CALCIUM: CPT

## 2022-01-29 PROCEDURE — 99232 SBSQ HOSP IP/OBS MODERATE 35: CPT | Performed by: INTERNAL MEDICINE

## 2022-01-29 PROCEDURE — 51798 US URINE CAPACITY MEASURE: CPT

## 2022-01-29 RX ORDER — DEXAMETHASONE SODIUM PHOSPHATE 10 MG/ML
10 INJECTION, SOLUTION INTRAMUSCULAR; INTRAVENOUS DAILY
Status: DISCONTINUED | OUTPATIENT
Start: 2022-01-29 | End: 2022-01-30 | Stop reason: HOSPADM

## 2022-01-29 RX ADMIN — PANTOPRAZOLE SODIUM 40 MG: 40 INJECTION, POWDER, FOR SOLUTION INTRAVENOUS at 09:28

## 2022-01-29 RX ADMIN — ENOXAPARIN SODIUM 50 MG: 100 INJECTION SUBCUTANEOUS at 20:59

## 2022-01-29 RX ADMIN — ENOXAPARIN SODIUM 50 MG: 100 INJECTION SUBCUTANEOUS at 12:14

## 2022-01-29 RX ADMIN — SODIUM CHLORIDE, PRESERVATIVE FREE 10 ML: 5 INJECTION INTRAVENOUS at 09:31

## 2022-01-29 RX ADMIN — DEXAMETHASONE SODIUM PHOSPHATE 10 MG: 10 INJECTION, SOLUTION INTRAMUSCULAR; INTRAVENOUS at 09:24

## 2022-01-29 RX ADMIN — Medication 10 ML: at 09:27

## 2022-01-29 RX ADMIN — Medication 10 ML: at 21:00

## 2022-01-29 ASSESSMENT — PAIN SCALES - GENERAL
PAINLEVEL_OUTOF10: 0

## 2022-01-29 NOTE — PROGRESS NOTES
P Pulmonary and Critical Care    Follow Up Note    Subjective:   CHIEF COMPLAINT / HPI:   Chief Complaint   Patient presents with    Altered Mental Status     Arrived by private ambulance from [facility] rt call for AMS. Pt is alert & responsive at baseline; last known normal last night & non verbal at this time. Interval history: Patient originally admitted 1/26/2022 with acute hypoxemic respiratory failure related to COVID-19 pneumonia he was intubated for a time but extubated. He is now on room air. Past Medical History:    Reviewed; no changes    Social History:    Reviewed; no changes    REVIEW OF SYSTEMS:    CONSTITUTIONAL:  negative for fevers and chills  RESPIRATORY:  See HPI  CARDIOVASCULAR:  negative for chest pain, palpitations, edema  GASTROINTESTINAL:  negative for nausea, vomiting, diarrhea, constipation and abdominal pain    Objective:   PHYSICAL EXAM:        VITALS:  BP (!) 119/96   Pulse 88   Temp 97.7 °F (36.5 °C) (Temporal)   Resp 20   Ht 6' (1.829 m)   Wt 103 lb 3.2 oz (46.8 kg)   SpO2 96%   BMI 14.00 kg/m²  on room air     24HR INTAKE/OUTPUT:      Intake/Output Summary (Last 24 hours) at 1/29/2022 1008  Last data filed at 1/29/2022 0930  Gross per 24 hour   Intake 662.01 ml   Output 750 ml   Net -87.99 ml       CONSTITUTIONAL:  awake, alert,  no apparent distress, and appears stated age  LUNGS:  No increased work of breathing and clear to auscultation, no crackles or wheezes  CARDIOVASCULAR: S1 and S2 and no JVD  ABDOMEN:  normal bowel sounds, non-distended and non-tender to palpation  EXT: No edema, no calf tenderness. Pulses are present bilaterally. NEUROLOGIC:  Mental Status Exam:  Level of Alertness:   awake  Orientation:   person, place, time.  Non focal  SKIN:  normal skin color, texture, turgor, no redness, warmth, or swelling at IV sites    DATA:    CBC:  Recent Labs     01/27/22  0430 01/27/22  0430 01/27/22  1010 01/28/22  0445 01/29/22  0519   WBC 9.2  --   -- 9. 6 7.7   RBC 4.59  --   --  4.05* 3.91*   HGB 13.6   < > 14.8 12.1* 11.6*   HCT 42.0  --   --  37.1* 36.0*     --   --  167 148   MCV 91.4  --   --  91.7 92.1   MCH 29.7  --   --  29.9 29.8   MCHC 32.5  --   --  32.6 32.3   RDW 14.2  --   --  14.4 14.7    < > = values in this interval not displayed. BMP:  Recent Labs     01/27/22  0430 01/28/22  0445 01/29/22  0518   * 149* 148*   K 4.3 4.5 4.4    106 110   CO2 29 35* 31   BUN 22* 29* 30*   CREATININE 0.7* 0.7* 0.5*   CALCIUM 8.6 9.2 9.0   GLUCOSE 133* 96 79      ABG:  Recent Labs     01/27/22  1010 01/28/22  0445   PHART 7.446 7.475*   BNR8UKD 50.6* 47.7*   PO2ART 124.7* 193.0*   SPG7AQH 34.9* 35.1*   X4VTGCJF 99 99.4   BEART 11* 10.0*     Procalcitonin  Recent Labs     01/27/22  0420   PROCAL 0.14           Radiology Review:  Pertinent images / reports were reviewed as a part of this visit. Assessment:     1. Acute hypoxemic respiratory failure  2. COVID-19 pneumonia  3. Acute pulmonary embolism      Plan:     I have reviewed laboratories, medical records and images for this visit    Patient presented with bilateral pulmonary emboli and COVID-19 pneumonia  Required intubation and mechanical ventilation  Has been liberated from mechanical ventilation and now is tolerating room air.   He is alert and cooperative  Remains on Decadron 10 mg daily  Also continues on full dose Lovenox for pulmonary emboli  May have an issue with aspiration  Suggest speech therapy evaluation  I will sign off

## 2022-01-29 NOTE — PROGRESS NOTES
Hospitalist Progress Note      PCP: No primary care provider on file. Date of Admission: 1/26/2022    Chief Complaint: unresponsive    Hospital Course: 49 y.o. male with past medical history of hypertension, HSV, schizoaffective disorder presents to the ED from nursing home in view of unresponsiveness. Last known well was yesterday. Found unresponsive this morning. Arrival to ED ED, multiple measures to wake up the patient failed. Per ED staff unable to protect airway. Was emergently intubated. CT head without acute findings. CTPA shows bilateral pulmonary emboli, started on heparin drip. Respiratory acidosis on blood gas. Hypertensive, central venous catheter inserted at the ED.       Subjective:   He has been able to come off the vent. Medications:  Reviewed    Infusion Medications    propofol Stopped (01/28/22 0913)    norepinephrine Stopped (01/27/22 1158)    sodium chloride Stopped (01/26/22 2336)    midazolam Stopped (01/27/22 0936)    fentaNYL Stopped (01/28/22 0913)     Scheduled Medications    dexamethasone (PF)  10 mg IntraVENous Daily    enoxaparin  1 mg/kg SubCUTAneous BID    pantoprazole  40 mg IntraVENous Daily    sodium chloride flush  5-40 mL IntraVENous 2 times per day     PRN Meds: sodium chloride flush, sodium chloride, ondansetron **OR** ondansetron, polyethylene glycol, acetaminophen **OR** acetaminophen, perflutren lipid microspheres      Intake/Output Summary (Last 24 hours) at 1/29/2022 1107  Last data filed at 1/29/2022 0930  Gross per 24 hour   Intake 662.01 ml   Output 750 ml   Net -87.99 ml       Physical Exam Performed:    BP (!) 119/96   Pulse 88   Temp 97.7 °F (36.5 °C) (Temporal)   Resp 20   Ht 6' (1.829 m)   Wt 103 lb 3.2 oz (46.8 kg)   SpO2 96%   BMI 14.00 kg/m²     General appearance: vented, appears comfortable, he is thin  HEENT: Pupils equal, round, and reactive to light. Conjunctivae/corneas clear.   Neck: Supple, with full range of motion. No jugular venous distention. Trachea midline. Respiratory:  Has some rhonchi  Cardiovascular: tachycardic rate and rhythm with normal S1/S2 without murmurs, rubs or gallops. Abdomen: Soft, non-tender, non-distended with normal bowel sounds. Musculoskeletal: No clubbing, cyanosis or edema bilaterally. Skin: Skin color, texture, turgor normal.  No rashes or lesions. Neurologic:  Neurovascularly intact without any focal sensory/motor deficits. Cranial nerves: II-XII intact, grossly non-focal.  Psychiatric: sedated  Capillary Refill: Brisk,3 seconds, normal   Peripheral Pulses: +2 palpable, equal bilaterally       Labs:   Recent Labs     01/27/22  0430 01/27/22  0430 01/27/22  1010 01/28/22  0445 01/29/22  0519   WBC 9.2  --   --  9.6 7.7   HGB 13.6   < > 14.8 12.1* 11.6*   HCT 42.0  --   --  37.1* 36.0*     --   --  167 148    < > = values in this interval not displayed. Recent Labs     01/27/22  0430 01/28/22  0445 01/29/22  0518   * 149* 148*   K 4.3 4.5 4.4    106 110   CO2 29 35* 31   BUN 22* 29* 30*   CREATININE 0.7* 0.7* 0.5*   CALCIUM 8.6 9.2 9.0   PHOS 2.5 4.8 3.4     Recent Labs     01/26/22  1315   AST 32   ALT 40   BILITOT 0.5   ALKPHOS 67     No results for input(s): INR in the last 72 hours. Recent Labs     01/26/22  1315 01/26/22  2225 01/27/22  0130   TROPONINI <0.01 0.02* 0.01       Urinalysis:      Lab Results   Component Value Date    NITRU Negative 01/26/2022    WBCUA 6 01/26/2022    RBCUA 3 01/26/2022    BLOODU Negative 01/26/2022    SPECGRAV 1.028 01/26/2022    GLUCOSEU Negative 01/26/2022    GLUCOSEU NEGATIVE 09/27/2010       Radiology:  XR CHEST PORTABLE   Preliminary Result   No acute abnormality. Hyperinflated lungs may relate to good inspiratory effort, COPD or asthma. XR CHEST PORTABLE   Final Result   1. Nasogastric tube terminating in the gastric body.          VL Extremity Venous Bilateral   Final Result      CT CHEST PULMONARY EMBOLISM W CONTRAST   Final Result   Moderate acute pulmonary embolus to the pulmonary arteries to the left lower   lobe and a smaller pulmonary emboli scattered in the pulmonary arteries to   the right lower lobe. Unremarkable thoracic aorta with no mediastinal mass or adenopathy and no   right heart significant right heart enlargement      ET tube and gastric tube in good position      Mild hyperinflation with no obvious acute pulmonary abnormality. The findings were called to Xiao Presley  at 5:40 p.m.         CT HEAD WO CONTRAST   Final Result   No hemorrhage or mass identified. There is cerebellar atrophy      Mild paranasal sinus disease      RECOMMENDATIONS:   Unavailable         XR ABDOMEN FOR NG/OG/NE TUBE PLACEMENT   Final Result   Tip of nasogastric tube is at the proximal stomach. Advancement of 8 cm is   suggested, in order for the distal side port of the tube to be intragastric. Mild to moderate gastric distention. XR CHEST PORTABLE   Final Result   ET tube terminates above the manasa. XR CHEST PORTABLE    (Results Pending)           Assessment/Plan:    Active Hospital Problems    Diagnosis     Altered mental status [R41.82]     Acute respiratory failure with hypoxia ([address] [J96.01]     Acute pulmonary embolism without acute cor pulmonale (HCC) [I26.99]      Bilateral pulmonary emboli  On heparin GTT, switched to therapeutic lovenox  No need for catheter directed thrombolysis  Remains on lovenox    COVID-19   POSITIVE  Is on decadron    Was intubated in the ED  Echo Mildly reduced global systolic function with an ejection fraction estimated   at 40-45%. -Mild Global hypokinesis noted  Tried on spontaneous mode today but was apneic and placed back on full support.    Now off the vent, he is on room air       VDRF  Emergently intubated in the ED for airway protection in view of unresponsiveness  Intensivist consulted for management management  Again is no longer on the vent and is tolerating this well      Acute hypercapnic respiratory failure  [place] on room air  -resolved.      Unresponsiveness  Now awake and alert and following commands.     Shock  Cardiogenic? Distributive  No signs of infection  CVC placed in the ED and started on Levophed  Empiric vancomycin and cefepime    DVT Prophylaxis: lovneox  Diet: Diet NPO  Code Status: Full Code    PT/OT Eval Status: eval and treat when appropriate    Dispo - can likely transfer out of icu   Hopefully go back to nursing home tomorrow or monday.          Jocy Rios MD

## 2022-01-29 NOTE — PROGRESS NOTES
Speech Language Pathology  Dysphagia Treatment Note    Name:  Selam Covarrubias  :   1965  Medical Diagnosis:  Hypercapnia [R06.89]  Pulmonary embolism, bilateral ([address] [I26.99]  CO2 narcosis [R06.89]  Altered mental status, unspecified altered mental status type [R41.82]  Acute pulmonary embolism without acute cor pulmonale, unspecified pulmonary embolism type (HCC) [I26.99]  Multiple subsegmental pulmonary emboli without acute cor pulmonale (HCC) [I26.94]  Treatment Diagnosis: Oropharyngeal Dysphagia  Pain level:  Pt denies pain at this time    Diet level prior to treatment: NPO    Assessment of Texture Tolerance:  -Impressions: Pt is alert and verbally responsive on RA. PT is not oriented to current place or situation but is able to follow basic commands with mod cues/prompts. Pt is noted to be impulsive with self feeding with hand over hand assistance to regulate bolus size required with thin liquids. Improved oral motor strength and ROM with no overt asymmetry noted with completion of OME. With po trials, moderately reduced bolus control and A-P propulsion noted with all textures. Clinical symptoms of pharyngeal pooling, delayed swallow initiation and suspected delayed pharyngeal clearing noted with all textures. No overt signs of aspiration noted with po trials given in limited bolus amounts. However, poor safety awareness, impulsivity and large bolus size when self feeding increases aspiration risk. Therefore, precautions should be followed with po advance at this time.     Diet and Treatment Recommendations 2022:  Advance diet to Dysphagia III Soft and Bite-Sized with thin liquids/no straws/meds with puree  Assist with meals to regulate bolus size and safety awareness    Compensatory strategies: Upright as possible with all PO intake , No straws , Assist Feed , Small bites/sips , Swallow 2 times per bite , Remain upright 30-45 min     Dysphagia Goals:   Pt will functionally tolerate ongoing assessment of swallow function with diet to be determined as indicated (ongoing 1/29/2022)   Pt will tolerate diet with no overt signs of aspiration (ongoing 1/29/2022)   Pt will advance to least restrictive diet as indicated (ongoing 1/29/2022)     Plan:  Continued daily Dysphagia treatment with goals per plan of care. Patient/Family Education:Education given to the Pt and nurse, who verbalized understanding    Discharge Recommendations:  Pt will benefit from continued skilled Speech Therapy for Dysphagia services, prior to returning home. Timed Code Treatment: 0 min    Total Treatment Time:20 min    If patient discharges prior to next session this note will serve as a discharge summary.      Abilio Cortez [id number]

## 2022-01-29 NOTE — PLAN OF CARE
Problem: Skin Integrity:  Goal: Will show no infection signs and symptoms  Description: Will show no infection signs and symptoms  Outcome: Ongoing  Goal: Absence of new skin breakdown  Description: Absence of new skin breakdown  Outcome: Ongoing     Problem: Falls - Risk of:  Goal: Will remain free from falls  Description: Will remain free from falls  Outcome: Ongoing  Goal: Absence of physical injury  Description: Absence of physical injury  Outcome: Ongoing     Problem: Nutrition  Goal: Optimal nutrition therapy  Outcome: Ongoing     Problem: Airway Clearance - Ineffective  Goal: Achieve or maintain patent airway  Outcome: Ongoing     Problem: Gas Exchange - Impaired  Goal: Absence of hypoxia  Outcome: Ongoing  Goal: Promote optimal lung function  Outcome: Ongoing     Problem: Breathing Pattern - Ineffective  Goal: Ability to achieve and maintain a regular respiratory rate  Outcome: Ongoing     Problem:  Body Temperature -  Risk of, Imbalanced  Goal: Ability to maintain a body temperature within defined limits  Outcome: Ongoing  Goal: Will regain or maintain usual level of consciousness  Outcome: Ongoing  Goal: Complications related to the disease process, condition or treatment will be avoided or minimized  Outcome: Ongoing     Problem: Isolation Precautions - Risk of Spread of Infection  Goal: Prevent transmission of infection  Outcome: Ongoing     Problem: Nutrition Deficits  Goal: Optimize nutritional status  Outcome: Ongoing     Problem: Risk for Fluid Volume Deficit  Goal: Maintain normal heart rhythm  Outcome: Ongoing  Goal: Maintain absence of muscle cramping  Outcome: Ongoing  Goal: Maintain normal serum potassium, sodium, calcium, phosphorus, and pH  Outcome: Ongoing     Problem: Loneliness or Risk for Loneliness  Goal: Demonstrate positive use of time alone when socialization is not possible  Outcome: Ongoing     Problem: Fatigue  Goal: Verbalize increase energy and improved vitality  Outcome: Ongoing     Problem: Patient Education: Go to Patient Education Activity  Goal: Patient/Family Education  Outcome: Ongoing

## 2022-01-29 NOTE — PROGRESS NOTES
Stark cath inserted, 16Fr with 10cc. Patient tolerated insertion well. 125mL urine, dark henrique, clear with no foul odor. Scant amount of blood noted r/t insertion.

## 2022-01-29 NOTE — PROGRESS NOTES
Patient had not urinated all night. Orders placed for bladder scan per dr [place]. Patient had 343 in bladder. Orders for straight cath placed. Patient tolerated well.  Will continue to monitor

## 2022-01-30 ENCOUNTER — APPOINTMENT (OUTPATIENT)
Dept: GENERAL RADIOLOGY | Age: 57
DRG: 208 | End: 2022-01-30
Payer: COMMERCIAL

## 2022-01-30 VITALS
DIASTOLIC BLOOD PRESSURE: 83 MMHG | OXYGEN SATURATION: 97 % | TEMPERATURE: 98.2 F | RESPIRATION RATE: 20 BRPM | HEART RATE: 93 BPM | HEIGHT: 72 IN | BODY MASS INDEX: 13.79 KG/M2 | WEIGHT: 101.8 LBS | SYSTOLIC BLOOD PRESSURE: 116 MMHG

## 2022-01-30 LAB
ANION GAP SERPL CALCULATED.3IONS-SCNC: 5 MMOL/L (ref 3–16)
BASOPHILS ABSOLUTE: 0 K/UL (ref 0–0.2)
BASOPHILS RELATIVE PERCENT: 0.1 %
BLOOD CULTURE, ROUTINE: NORMAL
BUN BLDV-MCNC: 28 MG/DL (ref 7–20)
CALCIUM IONIZED: 1.15 MMOL/L (ref 1.12–1.32)
CALCIUM SERPL-MCNC: 8.7 MG/DL (ref 8.3–10.6)
CHLORIDE BLD-SCNC: 110 MMOL/L (ref 99–110)
CO2: 31 MMOL/L (ref 21–32)
CREAT SERPL-MCNC: <0.5 MG/DL (ref 0.9–1.3)
CULTURE, BLOOD 2: NORMAL
EOSINOPHILS ABSOLUTE: 0 K/UL (ref 0–0.6)
EOSINOPHILS RELATIVE PERCENT: 0.2 %
GFR AFRICAN AMERICAN: >60
GFR NON-AFRICAN AMERICAN: >60
GLUCOSE BLD-MCNC: 87 MG/DL (ref 70–99)
HCT VFR BLD CALC: 32 % (ref 40.5–52.5)
HEMOGLOBIN: 10.4 G/DL (ref 13.5–17.5)
LYMPHOCYTES ABSOLUTE: 1.4 K/UL (ref 1–5.1)
LYMPHOCYTES RELATIVE PERCENT: 17.3 %
MAGNESIUM: 2.2 MG/DL (ref 1.8–2.4)
MCH RBC QN AUTO: 29.4 PG (ref 26–34)
MCHC RBC AUTO-ENTMCNC: 32.7 G/DL (ref 31–36)
MCV RBC AUTO: 89.9 FL (ref 80–100)
MONOCYTES ABSOLUTE: 0.6 K/UL (ref 0–1.3)
MONOCYTES RELATIVE PERCENT: 7.4 %
NEUTROPHILS ABSOLUTE: 6 K/UL (ref 1.7–7.7)
NEUTROPHILS RELATIVE PERCENT: 75 %
PDW BLD-RTO: 14.1 % (ref 12.4–15.4)
PH VENOUS: 7.45 (ref 7.35–7.45)
PHOSPHORUS: 2.5 MG/DL (ref 2.5–4.9)
PLATELET # BLD: 160 K/UL (ref 135–450)
PMV BLD AUTO: 10.5 FL (ref 5–10.5)
POTASSIUM SERPL-SCNC: 4 MMOL/L (ref 3.5–5.1)
RBC # BLD: 3.55 M/UL (ref 4.2–5.9)
SODIUM BLD-SCNC: 146 MMOL/L (ref 136–145)
WBC # BLD: 8 K/UL (ref 4–11)

## 2022-01-30 PROCEDURE — 97166 OT EVAL MOD COMPLEX 45 MIN: CPT

## 2022-01-30 PROCEDURE — 71045 X-RAY EXAM CHEST 1 VIEW: CPT

## 2022-01-30 PROCEDURE — 6360000002 HC RX W HCPCS: Performed by: INTERNAL MEDICINE

## 2022-01-30 PROCEDURE — 83735 ASSAY OF MAGNESIUM: CPT

## 2022-01-30 PROCEDURE — 82330 ASSAY OF CALCIUM: CPT

## 2022-01-30 PROCEDURE — 97535 SELF CARE MNGMENT TRAINING: CPT

## 2022-01-30 PROCEDURE — 84100 ASSAY OF PHOSPHORUS: CPT

## 2022-01-30 PROCEDURE — 2580000003 HC RX 258: Performed by: INTERNAL MEDICINE

## 2022-01-30 PROCEDURE — 85025 COMPLETE CBC W/AUTO DIFF WBC: CPT

## 2022-01-30 PROCEDURE — 97163 PT EVAL HIGH COMPLEX 45 MIN: CPT

## 2022-01-30 PROCEDURE — C9113 INJ PANTOPRAZOLE SODIUM, VIA: HCPCS | Performed by: INTERNAL MEDICINE

## 2022-01-30 PROCEDURE — 97530 THERAPEUTIC ACTIVITIES: CPT

## 2022-01-30 PROCEDURE — 80048 BASIC METABOLIC PNL TOTAL CA: CPT

## 2022-01-30 PROCEDURE — 36415 COLL VENOUS BLD VENIPUNCTURE: CPT

## 2022-01-30 RX ADMIN — SODIUM CHLORIDE, PRESERVATIVE FREE 10 ML: 5 INJECTION INTRAVENOUS at 09:28

## 2022-01-30 RX ADMIN — SODIUM CHLORIDE, PRESERVATIVE FREE 10 ML: 5 INJECTION INTRAVENOUS at 09:20

## 2022-01-30 RX ADMIN — Medication 10 ML: at 09:19

## 2022-01-30 RX ADMIN — DEXAMETHASONE SODIUM PHOSPHATE 10 MG: 10 INJECTION, SOLUTION INTRAMUSCULAR; INTRAVENOUS at 09:18

## 2022-01-30 RX ADMIN — ENOXAPARIN SODIUM 50 MG: 100 INJECTION SUBCUTANEOUS at 09:19

## 2022-01-30 RX ADMIN — PANTOPRAZOLE SODIUM 40 MG: 40 INJECTION, POWDER, FOR SOLUTION INTRAVENOUS at 09:19

## 2022-01-30 ASSESSMENT — PAIN SCALES - GENERAL: PAINLEVEL_OUTOF10: 0

## 2022-01-30 NOTE — PROGRESS NOTES
Occupational Therapy   Occupational Therapy Initial Assessment  Date: 2022   Patient Name: Snow Griffin  MRN: [id number]     : 1965    Date of Service: 2022    Discharge Recommendations: Snow Griffin scored a 8/24 on the AM-PAC ADL Inpatient form. Current research shows that an AM-PAC score of 17 or less is typically not associated with a discharge to the patient's home setting. Based on the patient's AM-PAC score and their current ADL deficits, it is recommended that the patient have 3-5 sessions per week of Occupational Therapy at d/c to increase the patient's independence. Please see assessment section for further patient specific details. Recommend return to [facility] with therapy services (difficulty determining prior level of care but patient very weak and needing extensive assistance this date). If patient discharges prior to next session this note will serve as a discharge summary. Please see below for the latest assessment towards goals. OT Equipment Recommendations  Other: patient is a LTC resident    Assessment   Performance deficits / Impairments: Decreased functional mobility ; Decreased ADL status; Decreased cognition;Decreased posture;Decreased endurance  Assessment: Patient is a LTC resident at [facility]. Admitted for unresponsiveness and needing emergent intubation.  Difficult to determine prior level of function (stated able to feed self, propel w/c but does not walk)  Treatment Diagnosis: Debility and decreased ADLs due to COVID+ and encephopathy  Prognosis: Fair;Good  Decision Making: Medium Complexity  History: LTC resident, assist for ADLs but able to feed self and propel w/c  Exam: Assist for bed mobility, ADLs, transfer bed to recliner  Assistance / Modification: Evolving presentation  OT Education: OT Role;Plan of Care  Patient Education: ADLs and transfers-- patient is not an indep learner, needs repetition  Barriers to Learning: Cognition  REQUIRES OT FOLLOW UP: Yes  Activity Tolerance  Activity Tolerance: Patient Tolerated treatment well  Safety Devices  Safety Devices in place: Yes  Type of devices: All fall risk precautions in place;Nurse notified;Call light within reach; Left in chair;Patient at risk for falls           Patient Diagnosis(es): The primary encounter diagnosis was Altered mental status, unspecified altered mental status type. Diagnoses of Hypercapnia, CO2 narcosis, Multiple subsegmental pulmonary emboli without acute cor pulmonale (HCC), and Acute pulmonary embolism without acute cor pulmonale, unspecified pulmonary embolism type ([address] were also pertinent to this visit. has a past medical history of Ataxia, Cerebellar atrophy ([address] Dysarthria, Herpes simplex, Hypertension, Ophthalmoplegia, Schizoaffective disorder ([address] Strabismus, and Vitamin D deficiency. has a past surgical history that includes Ankle fracture surgery (Right, 12/21/2017). Treatment Diagnosis: Debility and decreased ADLs due to COVID+ and encephopathy      Restrictions  Restrictions/Precautions  Restrictions/Precautions: Fall Risk (moderate fall risk  Simultaneous filing. User may not have seen previous data.)  Required Braces or Orthoses?: Yes  Position Activity Restriction  Other position/activity restrictions: 49 y.o. male with past medical history of hypertension, HSV, schizoaffective disorder presents to the ED from nursing home in view of unresponsiveness. Last known well was yesterday. Found unresponsive this morning. Arrival to ED ED, multiple measures to wake up the patient failed. Per ED staff unable to protect airway. Was emergently intubated. CT head without acute findings. CTPA shows bilateral pulmonary emboli, started on heparin drip. Respiratory acidosis on blood gas. Hypertensive, central venous catheter inserted at the ED.     Subjective   General  Chart Reviewed: Yes  Patient assessed for rehabilitation services?: Yes  Additional Pertinent Hx: Intubated in ER, extubated on 1/28, COVID+, cerebellar atrophy  Family / Caregiver Present: No  Diagnosis: Hypercapnia, encephalopathy, AMS, COVID+  Subjective  Subjective: Patient supine in bed, difficulty understanding speech but able to make basic needs known with increased time  Patient Currently in Pain: Denies (Simultaneous filing. User may not have seen previous data.)  Pain Assessment  Pain Assessment: 0-10  Pain Level: 0  Patient's Stated Pain Goal: No pain  Vital Signs  Temp Source: Temporal  Pulse: 93  Heart Rate Source: Monitor  Resp: 20  BP: 116/83  BP Location: Left upper arm  MAP (mmHg): 93  Patient Position: Semi fowlers  Level of Consciousness: Alert (0)  Patient Currently in Pain: Denies (Simultaneous filing. User may not have seen previous data.)  Social/Functional History  Social/Functional History  Type of Home: Facility ([facility])  Home Layout: One level  Home Equipment: [employer]  ADL Assistance: Needs assistance  Homemaking Assistance: Needs assistance  Homemaking Responsibilities: No  Ambulation Assistance: Independent  Transfer Assistance: Independent  Active : No  Occupation: Unemployed  Additional Comments: Patient is a LTC resident at [facility]. Patient did state he did not walk and able to propel his w/c around, needs assist with bathing and dressing but able to feed self . Patient very malnourished.        Objective   Vision: Within Functional Limits  Hearing: Within functional limits    Orientation  Overall Orientation Status: Impaired  Orientation Level: Oriented to person;Disoriented to situation;Disoriented to time;Disoriented to place  Observation/Palpation  Posture: Fair  Observation: Extremely malnurished-- 6', 101 pounds  Balance  Sitting Balance: Minimal assistance  Standing Balance: Maximum assistance  Standing Balance  Time: @ 5 seconds  Activity: Stand pivot bed to recliner  Comment: Max assist stand pivot  ADL  Feeding: Maximum assistance (nurse stated had to feed patient breakfast)  Grooming: Minimal assistance (washed face with minimal assist)  LE Dressing: Dependent/Total (socks)  Toileting: Dependent/Total (catheter)  Additional Comments: Patient appears to need max/dep all ADLs. Nurse reported had to feed patient breakfast  Tone RUE  RUE Tone: Normotonic  Tone LUE  LUE Tone: Normotonic  Quality of Movement Other  Comment: Decreased control of right UE        Transfers  Stand Pivot Transfers: Maximum assistance  Sit to stand: Maximum assistance  Stand to sit: Maximum assistance  Vision - Basic Assessment  Visual History: No significant visual history  Patient Visual Report: No visual complaint reported. Cognition  Overall Cognitive Status: Exceptions  Arousal/Alertness: Delayed responses to stimuli  Following Commands: Follows one step commands with repetition  Attention Span: Appears intact  Memory: Decreased recall of recent events;Decreased recall of precautions;Decreased recall of biographical Information;Decreased long term memory;Decreased short term memory  Safety Judgement: Decreased awareness of need for assistance;Decreased awareness of need for safety  Insights: Decreased awareness of deficits  Cognition Comment: Patient with mild delay when answering questions. Difficulty understanding speech. Patient able to make basic needs known.         Sensation  Overall Sensation Status: WFL        LUE AROM (degrees)  LUE AROM : WFL  Left Hand AROM (degrees)  Left Hand AROM: WFL  RUE AROM (degrees)  RUE AROM : WFL  Right Hand AROM (degrees)  Right Hand AROM: WFL  LUE Strength  Gross LUE Strength: WFL  RUE Strength  Gross RUE Strength: WFL  RUE Strength Comment: Right UE with mild ataxia in shoulder                   Plan   Plan  Times per week: 3-5  Specific instructions for Next Treatment: cotx  Current Treatment Recommendations: Strengthening,Patient/Caregiver Education & Training,Self-Care / ADL,Functional Mobility Training,Safety Education & Training    G-Code     OutComes Score                                                  AM-PAC Score        AM-PAC Inpatient Daily Activity Raw Score: 8 (01/30/22 1057)  AM-PAC Inpatient ADL T-Scale Score : 22.86 (01/30/22 1057)  ADL Inpatient CMS 0-100% Score: 85.69 (01/30/22 1057)  ADL Inpatient CMS G-Code Modifier : CM (01/30/22 1057)    Goals  Short term goals  Time Frame for Short term goals: Until discharge  Short term goal 1: Min assist feeding self  Short term goal 2: Min assist UE ADLs  Short term goal 3: Minimal assist functional mobility  Short term goal 4: Minimal assist functional transfers  Long term goals  Time Frame for Long term goals : STGs= LTGs  Patient Goals   Patient goals : None stated       Therapy Time   Individual Concurrent Group Co-treatment   Time In 0956         Time Out 1038         Minutes 42              Timed Code Treatment Minutes:  27 Minutes    Total Treatment Minutes:  [facility],

## 2022-01-30 NOTE — PROGRESS NOTES
Physical Therapy    Facility/Department: [facility]  Initial Assessment    NAME: Eric Vu  : 1965  MRN: [id number]    Date of Service: 2022    Discharge Recommendations:  Eric Vu scored a 10/24 on the AM-PAC short mobility form. Current research shows that an AM-PAC score of 17 or less is typically not associated with a discharge to the patient's home setting. Based on the patient's AM-PAC score and their current functional mobility deficits, it is recommended that the patient have 3-5 sessions per week of Physical Therapy at d/c to increase the patient's independence. Please see assessment section for further patient specific details. If patient discharges prior to next session this note will serve as a discharge summary. Please see below for the latest assessment towards goals. 24 hour supervision or assist,3-5 sessions per week   PT Equipment Recommendations  Equipment Needed: No  Other: Defer to next level of care. Assessment   Body structures, Functions, Activity limitations: Decreased functional mobility ; Decreased balance;Decreased ROM; Decreased strength;Decreased high-level IADLs;Decreased safe awareness;Decreased endurance;Decreased cognition  Assessment: Patient admitted w/ AMS, intubated on admission for approximately 24 hours and now extubated. He reports being non-ambulatory and using a w/c which he is able to propel himself. Presently, patient is requiring assistance for transferring bed-chair. He cannot tell me if he is normally able to transfer to his w/c by himself. Recommend 24 hour assist and continued therapy if patient is not at functional baseline. Treatment Diagnosis: functional mobility deficits secondary to illness  Prognosis: Fair  Decision Making: High Complexity  History: As noted. Exam: ROM, MMT, functional mobility assessment  Clinical Presentation: Stable, currently.   PT Education: Goals;PT Role;Plan of Care;Transfer Training;General Safety;Orientation; Functional Mobility Training  Patient Education: Patient verbalized understanding. Barriers to Learning: None evident. REQUIRES PT FOLLOW UP: Yes  Activity Tolerance  Activity Tolerance: Patient limited by fatigue;Patient limited by endurance; Patient limited by cognitive status  Activity Tolerance: Patient only able to perform bed-chair transfer as this is his functional baseline. He does not ambulate. Patient Diagnosis(es): The primary encounter diagnosis was Altered mental status, unspecified altered mental status type. Diagnoses of Hypercapnia, CO2 narcosis, Multiple subsegmental pulmonary emboli without acute cor pulmonale (HCC), and Acute pulmonary embolism without acute cor pulmonale, unspecified pulmonary embolism type ([address] were also pertinent to this visit. has a past medical history of Ataxia, Cerebellar atrophy ([address] Dysarthria, Herpes simplex, Hypertension, Ophthalmoplegia, Schizoaffective disorder ([address] Strabismus, and Vitamin D deficiency. has a past surgical history that includes Ankle fracture surgery (Right, 12/21/2017). Restrictions  Restrictions/Precautions  Restrictions/Precautions: Fall Risk (moderate fall risk  Simultaneous filing. User may not have seen previous data.)  Required Braces or Orthoses?: Yes  Position Activity Restriction  Other position/activity restrictions: 49 y.o. male with past medical history of hypertension, HSV, schizoaffective disorder presents to the ED from nursing home in view of unresponsiveness. Last known well was yesterday. Found unresponsive this morning. Arrival to ED ED, multiple measures to wake up the patient failed. Per ED staff unable to protect airway. Was emergently intubated. CT head without acute findings. CTPA shows bilateral pulmonary emboli, started on heparin drip. Respiratory acidosis on blood gas. Hypertensive, central venous catheter inserted at the ED.      Vision/Hearing  Vision: Within Functional Limits  Hearing: Within functional limits       Subjective  General  Chart Reviewed: Yes  Patient assessed for rehabilitation services?: Yes  Response To Previous Treatment: Not applicable  Family / Caregiver Present: No  Diagnosis: COVID +, AMS, acute encephalopathy, PE, hypercapnea  Follows Commands: Within Functional Limits  General Comment  Comments: Patient supine in bed w/ HOB elevated. Subjective  Subjective: Patient gives a vague history. He is oriented only to self. Per subjective reports, he doesn't walk but is able to push his own w/c. He cannot say how he transfers into/out of w/c. Pain Screening  Patient Currently in Pain: Denies (Simultaneous filing. User may not have seen previous data.)  Vital Signs  Patient Currently in Pain: Denies (Simultaneous filing. User may not have seen previous data.)       Orientation  Orientation  Overall Orientation Status: Impaired  Orientation Level: Oriented to person;Disoriented to place; Disoriented to time;Disoriented to situation     Social/Functional History  Social/Functional History  Type of Home: Facility ([facility])  Home Layout: One level  Home Equipment: [employer]  ADL Assistance: Needs assistance  Homemaking Assistance: Needs assistance  Homemaking Responsibilities: No  Ambulation Assistance: Independent  Transfer Assistance: Independent  Active : No  Occupation: Unemployed  Additional Comments: Patient is a LTC resident at [facility]. Patient did state he did not walk and able to propel his w/c around, needs assist with bathing and dressing but able to feed self . Patient very malnourished. Objective  PROM RLE (degrees)  RLE PROM: WFL  RLE General PROM: Patient lacks approximately 5-10 degrees of TKE presumably d/t prolonged time in w/c. AROM RLE (degrees)  RLE AROM: WFL  PROM LLE (degrees)  LLE PROM: WFL  LLE General PROM: Patient lacks approximately 5-10 degrees of TKE presumably d/t prolonged time in w/c.   AROM LLE (degrees)  LLE AROM : WFL  Strength RLE  Comment: Grossly 3/5, unable to stand. Strength LLE  Comment: Grossly 3/5, unable to stand. Bed mobility  Supine to Sit: Maximum assistance  Scooting: Maximal assistance  Transfers  Sit to Stand: Maximum Assistance  Stand to sit: Maximum Assistance  Bed to Chair: Maximum assistance  Squat Pivot Transfers: Maximum Assistance  Comment: Gait belt donned. Patient unable to stand up, squat pivot transfer performed to transfer bed-chair. Ambulation  Ambulation?: No  Stairs/Curb  Stairs?: No     Balance  Posture: Fair  Sitting - Static: Fair  Sitting - Dynamic: Poor  Standing - Static: Poor  Standing - Dynamic: Poor        Plan   Plan  Times per week: 3-5  Current Treatment Recommendations: Strengthening,Safety Education & Training,ROM,Balance Training,Endurance Training,Patient/Caregiver Education & Training,Functional Mobility Training,Equipment Evaluation, Education, & procurement,Transfer Training  Safety Devices  Type of devices: All fall risk precautions in place,Call light within reach,Chair alarm in place,Gait belt,Patient at risk for falls,Left in chair,Nurse notified  Restraints  Initially in place: No           AM-PAC Score  AM-PAC Inpatient Mobility Raw Score : 10 (01/30/22 1039)  AM-PAC Inpatient T-Scale Score : 32.29 (01/30/22 1039)  Mobility Inpatient CMS 0-100% Score: 76.75 (01/30/22 1039)  Mobility Inpatient CMS G-Code Modifier : CL (01/30/22 1039)          Goals  Short term goals  Time Frame for Short term goals: Discharge. Short term goal 1: Patient will perform bed mobility w/ MOD assist.  Short term goal 2: Patient will perform sit-stand w/ MOD assist and RW. Short term goal 3: Patient will transfer bed-chair w/ MOD assist and RW. Patient Goals   Patient goals : None verbalized.        Therapy Time   Individual Concurrent Group Co-treatment   Time In 0956         Time Out 1038         Minutes 42         Timed Code Treatment Minutes: 27 Minutes Yamilex Robbins, [place], DPT, ATC-R [id number]

## 2022-01-30 NOTE — DISCHARGE INSTR - COC
Continuity of Care Form    Patient Name: Jannet Bahena   :  1965  MRN:  [id number]    Admit date:  2022  Discharge date:    22    Code Status Order: Full Code   Advance Directives:      Admitting Physician:  Cesar Serra MD  PCP: No primary care provider on file. Discharging Nurse: [facility] [place] 2[place] Unit/Room#: G2V-2591/1445-13  Discharging Unit Phone Number: [phone]    Emergency Contact:   Extended Emergency Contact Information  Primary Emergency Contact: He Ann[address] Phone: [phone]  Relation: Other  Secondary Emergency Contact: Jocelyn Johnson[address] Phone: [phone]  Relation: Other    Past Surgical History:  Past Surgical History:   Procedure Laterality Date    ANKLE FRACTURE SURGERY Right 2017     RIGHT ANKLE OPEN REDUCTION INTERNAL FIXATION       Immunization History: There is no immunization history on file for this patient.     Active Problems:  Patient Active Problem List   Diagnosis Code    Dysarthria R47.1    Ataxia R27.0    Ophthalmoplegia H49.9    Cerebellar atrophy ([facility] [address] G31.9    Herpes simplex B00.9    Vitamin D deficiency E55.9    Frequent falls R29.6    17 ORIF RIGHT distal fibula fracture S82.832A    Displaced fracture of distal end of right fibula S82.831A    Acute pulmonary embolism without acute cor pulmonale (HCC) I26.99    Altered mental status R41.82    Acute respiratory failure with hypoxia (HCC) J96.01       Isolation/Infection:   Isolation            Droplet Plus          Patient Infection Status       Infection Onset Added Last Indicated Last Indicated By Review Planned Expiration Resolved Resolved By    COVID-19 22 COVID-19 22      Resolved    COVID-19 (Rule Out) 22 COVID-19 (Ordered)   22 Rule-Out Test Resulted            Nurse Assessment:  Last Vital Signs: /83   Pulse 93   Temp 98.2 °F (36.8 °C) (Temporal)   Resp 20   Ht 6' (1.829 m)   Wt 101 lb 12.8 oz (46.2 kg)   SpO2 97%   BMI 13.81 kg/m²     Last documented pain score (0-10 scale): Pain Level: 0  Last Weight:   Wt Readings from Last 1 Encounters:   01/30/22 101 lb 12.8 oz (46.2 kg)     Mental Status:  disoriented and alert    IV Access:  - Peripheral IV - site  Right forearm, insertion date: 1/26/2022    Nursing Mobility/ADLs:  Walking   Dependent  Transfer  Dependent  Bathing  Dependent  Dressing  Dependent  Toileting  Dependent  Feeding  Dependent  Med Admin  Dependent  Med Delivery   crushed    Wound Care Documentation and Therapy:  Incision 12/21/17 Ankle Right (Active)   Number of days: 1500        Elimination:  Continence: Bowel: No  Bladder: No  Urinary Catheter: Insertion Date: 1/29/2022 and Indication for Use of Catheter: Acute urinary retention/obstruction   Colostomy/Ileostomy/Ileal Conduit: No       Date of Last BM: 1/30/2022    Intake/Output Summary (Last 24 hours) at 1/30/2022 1336  Last data filed at 1/30/2022 0928  Gross per 24 hour   Intake 520 ml   Output 445 ml   Net 75 ml     I/O last 3 completed shifts: In: 390 [P.O.:360; I.V.:30]  Out: 870 [Urine:870]    Safety Concerns: At Risk for Falls and Aspiration Risk    Impairments/Disabilities:      Speech    Nutrition Therapy:  Current Nutrition Therapy:   - Oral Diet:  Dysphagia 2 mechanically altered    Routes of Feeding: Oral  Liquids: Thin liquids, NO STRAWS  Daily Fluid Restriction: no  Last Modified Barium Swallow with Video (Video Swallowing Test): not done    Treatments at the Time of Hospital Discharge:   Respiratory Treatments: No  Oxygen Therapy:  is not on home oxygen therapy.   Ventilator:    - No ventilator support    Rehab Therapies: Physical Therapy and Occupational Therapy  Weight Bearing Status/Restrictions: No weight bearing restirctions  Other Medical Equipment (for information only, NOT a DME order):  wheelchair  Other Treatments:     Patient's personal belongings (please select all that are sent with patient):  None    RN SIGNATURE:  Electronically signed by Edgard Webster RN on 1/30/22 at 2:39 PM EST    CASE MANAGEMENT/SOCIAL WORK SECTION    Inpatient Status Date:    1/26/22    Readmission Risk Assessment Score:  Readmission Risk              Risk of Unplanned Readmission:  15           Discharging to Facility/ [address]  [id number]    F [phone]  R [phone]     / signature: Electronically signed by Willie Ross RN on 1/30/22 at 1:37 PM EST    PHYSICIAN SECTION    Prognosis: Fair    Condition at Discharge: Stable    Rehab Potential (if transferring to Rehab): Fair    Recommended Labs or Other Treatments After Discharge:  Speech therapy for dysphagia tx 3-5 times per week during acute care stay. Pt will functionally tolerate ongoing assessment of swallow function with diet to be determined as indicated     Diet soft and bite sized, no drinking straws, Meds with odilia    Physician Certification: I certify the above information and transfer of Yoandy Clarity  is necessary for the continuing treatment of the diagnosis listed and that he requires long term care for greater 30 days.      Update Admission H&P: No change in H&P    PHYSICIAN SIGNATURE:  Electronically signed by Wyn Homans, MD on 1/30/22 at 1:58 PM EST

## 2022-01-30 NOTE — PROGRESS NOTES
[facility] facility and  answered and attempted to transfer phone call to unit who is taking care of this patient, unable to get through to a staff member, the  gave me the supervisor fax number to fax information over. GEMA and AVS sent to [phone].

## 2022-03-10 NOTE — DISCHARGE SUMMARY
Hospital Medicine Discharge Summary    Patient: Antione Vidales     Gender: male  : 1965   Age: 49 y.o. MRN: [id number]    Admitting Physician: Savanna Johansen MD  Discharge Physician: Froylan Mckay MD     Code Status: Prior     Admit Date: 2022   Discharge Date: 2022      Disposition:  snf    Discharge Diagnoses: Active Hospital Problems    Diagnosis Date Noted    Altered mental status [R41.82]     Acute respiratory failure with hypoxia (HCC) [J96.01]     Acute pulmonary embolism without acute cor pulmonale ([address] [I26.99] 2022       Follow-up appointments:  two weeks    Outpatient to do list: follow up    Condition at Discharge:  [address] Course:   49 y. o. male with past medical history of hypertension, HSV, schizoaffective disorder presents to the ED from nursing home in view of unresponsiveness.  Last known well was yesterday.  Found unresponsive this morning.  Arrival to ED ED, multiple measures to wake up the patient failed.  Per ED staff unable to protect airway.  Was emergently intubated.  CT head without acute findings. Carron Patee shows bilateral pulmonary emboli, started on heparin drip.  Respiratory acidosis on blood gas. Hypotensive, central venous catheter inserted at the ED.       Bilateral pulmonary emboli  On heparin GTT, switched to therapeutic lovenox  No need for catheter directed thrombolysis  Remains on lovenox     COVID-19   POSITIVE  Is on decadron     Was intubated in the ED  Echo Mildly reduced global systolic function with an ejection fraction estimated   at 40-45%.  -Mild Global hypokinesis noted  Tried on spontaneous mode today but was apneic and placed back on full support.    Now off the vent, he is on room air        VDRF  Emergently intubated in the ED for airway protection in view of unresponsiveness  Intensivist consulted for management management  Again is no longer on the vent and is tolerating this well      Acute hypercapnic respiratory failure  [place] on room air  -resolved.      Unresponsiveness  Now awake and alert and following commands.     Shock  Cardiogenic?   Distributive  No signs of infection  CVC placed in the ED and started on Levophed  Empiric vancomycin and cefepime       Discharge Medications:   Discharge Medication List as of 1/30/2022  2:42 PM      START taking these medications    Details   apixaban starter pack (ELIQUIS DVT/PE STARTER PACK) 5 MG TBPK tablet Take 1 tablet by mouth See Admin Instructions, Disp-74 tablet, R-0Print      apixaban (ELIQUIS) 5 MG TABS tablet Take 1 tablet by mouth 2 times daily, Disp-60 tablet, R-5Print           Discharge Medication List as of 1/30/2022  2:42 PM        Discharge Medication List as of 1/30/2022  2:42 PM      CONTINUE these medications which have NOT CHANGED    Details   ascorbic acid (VITAMIN C) 500 MG tablet Take 500 mg by mouth dailyHistorical Med      zinc gluconate 50 MG tablet Take 50 mg by mouth dailyHistorical Med      DULoxetine (CYMBALTA) 30 MG extended release capsule Take 30 mg by mouth dailyHistorical Med      divalproex (DEPAKOTE ER) 500 MG extended release tablet Take 500 mg by mouth dailyHistorical Med      amLODIPine (NORVASC) 10 MG tablet Take 10 mg by mouth dailyHistorical Med      tiZANidine (ZANAFLEX) 4 MG tablet Take 4 mg by mouth every 6 hours as neededHistorical Med      traZODone (DESYREL) 100 MG tablet Take 75 mg by mouth nightlyHistorical Med      naproxen (NAPROSYN) 500 MG tablet Take 1 tablet by mouth 2 times daily for 20 doses, Disp-20 tablet, R-0Print           Discharge Medication List as of 1/30/2022  2:42 PM          Discharge ROS:  A complete review of systems was asked and negative except for weakness    Discharge Exam:    /83   Pulse 93   Temp 98.2 °F (36.8 °C) (Temporal)   Resp 20   Ht 6' (1.829 m)   Wt 101 lb 12.8 oz (46.2 kg)   SpO2 97%   BMI 13.81 kg/m²   General appearance:  appears comfortable, he is thin, off vent  HEENT: Pupils equal, round, and reactive to light. Conjunctivae/corneas clear. Neck: Supple, with full range of motion. No jugular venous distention. Trachea midline. Respiratory:  Has some rhonchi  Cardiovascular: tachycardic rate and rhythm with normal S1/S2 without murmurs, rubs or gallops. Abdomen: Soft, non-tender, non-distended with normal bowel sounds. Musculoskeletal: No clubbing, cyanosis or edema bilaterally. Skin: Skin color, texture, turgor normal.  No rashes or lesions. Neurologic:  Neurovascularly intact without any focal sensory/motor deficits. Cranial nerves: II-XII intact, grossly non-focal.  Psychiatric: awake  Capillary Refill: Brisk,3 seconds, normal   Peripheral Pulses: +2 palpable, equal bilaterally    Labs: For convenience and continuity at follow-up the following most recent labs are provided:    Lab Results   Component Value Date    WBC 8.0 01/30/2022    HGB 10.4 01/30/2022    HCT 32.0 01/30/2022    MCV 89.9 01/30/2022     01/30/2022     01/30/2022    K 4.0 01/30/2022    K 4.0 08/15/2019     01/30/2022    CO2 31 01/30/2022    BUN 28 01/30/2022    CREATININE <0.5 01/30/2022    CALCIUM 8.7 01/30/2022    PHOS 2.5 01/30/2022    BNP <5.0 09/27/2010    ALKPHOS 67 01/26/2022    ALT 40 01/26/2022    AST 32 01/26/2022    BILITOT 0.5 01/26/2022    BILIDIR <0.2 10/24/2014    LABALBU 3.8 01/26/2022    LDLCALC 136 10/24/2014    TRIG 131 01/26/2022     Lab Results   Component Value Date    INR 1.19 (H) 12/21/2017    INR 1.11 09/27/2010       Radiology:  No results found. The patient was seen and examined on day of discharge and this discharge summary is in conjunction with any daily progress note from day of discharge. Time Spent on discharge is 32 minutes in the examination, evaluation, counseling and review of medications and discharge plan.       Note that greater  than 30 minutes was spent in preparing discharge papers, discussing discharge with patient, medication review, etc.       Signed:    Adryan Hernandez MD   3/10/2022      Thank you No primary care provider on file. for the opportunity to be involved in this patient's care.  If you have any questions or concerns please feel free to contact me at [phone]

## 2022-05-06 ENCOUNTER — HOSPITAL ENCOUNTER (EMERGENCY)
Age: 57
Discharge: HOME | End: 2022-05-06
Attending: EMERGENCY MEDICINE
Payer: COMMERCIAL

## 2022-05-06 DIAGNOSIS — I46.9 CARDIAC ARREST (HCC): Primary | ICD-10-CM

## 2022-05-06 PROCEDURE — 99283 EMERGENCY DEPT VISIT LOW MDM: CPT

## 2022-05-06 PROCEDURE — 92950 HEART/LUNG RESUSCITATION CPR: CPT
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
## 2022-05-06 NOTE — ED TRIAGE NOTES
Nursing home called ems [phone] pt in arrest . cpr in progress on ems arrival to ED . Hakeemana Speak doing compressions and Lobito airway being bagged . Pulse check on arrival pt in asystole . Pt in asystole since medic arrival to nursing home.  Dr Clemons Ferguson called @ [phone]

## 2022-05-06 NOTE — ED NOTES
Talked w/ son on phone and he is enroute as is pt's wife      Felicitas Woodard, [address] [facility]  05/06/22 [id number]

## 2022-05-06 NOTE — ED NOTES
Spoke with Jameson Glover at [facility], ok to take pt. To [facility] but do not release to [facility].      Scarlet Kidd RN  05/06/22 [id number]